# Patient Record
Sex: FEMALE | Race: WHITE | NOT HISPANIC OR LATINO | Employment: OTHER | URBAN - METROPOLITAN AREA
[De-identification: names, ages, dates, MRNs, and addresses within clinical notes are randomized per-mention and may not be internally consistent; named-entity substitution may affect disease eponyms.]

---

## 2023-02-24 LAB
ALANINE AMINOTRANSFERASE (SGPT) (U/L) IN SER/PLAS: 19 U/L (ref 7–45)
ALBUMIN (G/DL) IN SER/PLAS: 4.6 G/DL (ref 3.4–5)
ALKALINE PHOSPHATASE (U/L) IN SER/PLAS: 107 U/L (ref 33–136)
ANION GAP IN SER/PLAS: 13 MMOL/L (ref 10–20)
ASPARTATE AMINOTRANSFERASE (SGOT) (U/L) IN SER/PLAS: 26 U/L (ref 9–39)
BILIRUBIN TOTAL (MG/DL) IN SER/PLAS: 0.8 MG/DL (ref 0–1.2)
CALCIDIOL (25 OH VITAMIN D3) (NG/ML) IN SER/PLAS: 68 NG/ML
CALCIUM (MG/DL) IN SER/PLAS: 10.5 MG/DL (ref 8.6–10.6)
CARBON DIOXIDE, TOTAL (MMOL/L) IN SER/PLAS: 29 MMOL/L (ref 21–32)
CHLORIDE (MMOL/L) IN SER/PLAS: 105 MMOL/L (ref 98–107)
CHOLESTEROL (MG/DL) IN SER/PLAS: 134 MG/DL (ref 0–199)
CHOLESTEROL IN HDL (MG/DL) IN SER/PLAS: 55.4 MG/DL
CHOLESTEROL/HDL RATIO: 2.4
CREATININE (MG/DL) IN SER/PLAS: 1.05 MG/DL (ref 0.5–1.05)
GFR FEMALE: 54 ML/MIN/1.73M2
GLUCOSE (MG/DL) IN SER/PLAS: 92 MG/DL (ref 74–99)
LDL: 65 MG/DL (ref 0–99)
POC CALCIUM IONIZED (MMOL/L) IN BLOOD: 1.31 MMOL/L (ref 1.1–1.33)
POTASSIUM (MMOL/L) IN SER/PLAS: 3.9 MMOL/L (ref 3.5–5.3)
PROTEIN TOTAL: 7.2 G/DL (ref 6.4–8.2)
SODIUM (MMOL/L) IN SER/PLAS: 143 MMOL/L (ref 136–145)
TRIGLYCERIDE (MG/DL) IN SER/PLAS: 66 MG/DL (ref 0–149)
UREA NITROGEN (MG/DL) IN SER/PLAS: 26 MG/DL (ref 6–23)
VLDL: 13 MG/DL (ref 0–40)

## 2023-02-25 LAB — PARATHYRIN INTACT (PG/ML) IN SER/PLAS: 107.3 PG/ML (ref 18.5–88)

## 2023-04-11 PROBLEM — M18.12 ARTHRITIS OF CARPOMETACARPAL (CMC) JOINT OF LEFT THUMB: Status: ACTIVE | Noted: 2023-04-11

## 2023-04-11 PROBLEM — I25.10 CORONARY ARTERY DISEASE WITHOUT ANGINA PECTORIS: Status: ACTIVE | Noted: 2023-04-11

## 2023-04-11 PROBLEM — E78.5 HYPERLIPIDEMIA: Status: ACTIVE | Noted: 2023-04-11

## 2023-04-11 PROBLEM — I51.7 LEFT ATRIAL ENLARGEMENT: Status: ACTIVE | Noted: 2023-04-11

## 2023-04-11 PROBLEM — R91.8 MULTIPLE PULMONARY NODULES: Status: ACTIVE | Noted: 2023-04-11

## 2023-04-11 PROBLEM — I10 BENIGN ESSENTIAL HYPERTENSION: Status: ACTIVE | Noted: 2023-04-11

## 2023-04-11 PROBLEM — H35.30 MACULAR DEGENERATION: Status: ACTIVE | Noted: 2023-04-11

## 2023-04-11 PROBLEM — E04.2 MULTINODULAR GOITER: Status: ACTIVE | Noted: 2023-04-11

## 2023-04-11 PROBLEM — E83.52 HYPERCALCEMIA: Status: ACTIVE | Noted: 2023-04-11

## 2023-04-11 PROBLEM — G47.33 OSA (OBSTRUCTIVE SLEEP APNEA): Status: ACTIVE | Noted: 2023-04-11

## 2023-04-11 PROBLEM — G47.33 OBSTRUCTIVE SLEEP APNEA ON CPAP: Status: ACTIVE | Noted: 2023-04-11

## 2023-04-11 PROBLEM — Z96.652 HISTORY OF TOTAL LEFT KNEE REPLACEMENT: Status: ACTIVE | Noted: 2023-04-11

## 2023-04-11 PROBLEM — E04.1 THYROID NODULE: Status: ACTIVE | Noted: 2023-04-11

## 2023-04-11 PROBLEM — I10 ESSENTIAL HYPERTENSION: Status: ACTIVE | Noted: 2023-04-11

## 2023-04-11 PROBLEM — E78.2 COMBINED HYPERLIPIDEMIA: Status: ACTIVE | Noted: 2023-04-11

## 2023-04-11 PROBLEM — I48.19 PERSISTENT ATRIAL FIBRILLATION (MULTI): Status: ACTIVE | Noted: 2023-04-11

## 2023-04-11 PROBLEM — H90.2 CONDUCTIVE HEARING LOSS: Status: ACTIVE | Noted: 2023-04-11

## 2023-04-11 PROBLEM — N18.31 STAGE 3A CHRONIC KIDNEY DISEASE (MULTI): Status: ACTIVE | Noted: 2023-04-11

## 2023-04-11 RX ORDER — METOPROLOL SUCCINATE 50 MG/1
1 TABLET, EXTENDED RELEASE ORAL DAILY
COMMUNITY
Start: 2017-09-08 | End: 2023-09-08 | Stop reason: SDUPTHER

## 2023-04-11 RX ORDER — MULTIVITAMIN
TABLET ORAL
COMMUNITY

## 2023-04-11 RX ORDER — LISINOPRIL AND HYDROCHLOROTHIAZIDE 12.5; 2 MG/1; MG/1
1 TABLET ORAL DAILY
COMMUNITY
Start: 2015-09-24 | End: 2023-09-08 | Stop reason: SDUPTHER

## 2023-04-11 RX ORDER — SIMVASTATIN 40 MG/1
1 TABLET, FILM COATED ORAL DAILY
COMMUNITY
Start: 2017-09-08 | End: 2023-09-08 | Stop reason: SDUPTHER

## 2023-04-11 RX ORDER — VIT C/E/ZN/COPPR/LUTEIN/ZEAXAN 250MG-90MG
CAPSULE ORAL
COMMUNITY

## 2023-09-08 ENCOUNTER — OFFICE VISIT (OUTPATIENT)
Dept: PRIMARY CARE | Facility: CLINIC | Age: 78
End: 2023-09-08
Payer: MEDICARE

## 2023-09-08 ENCOUNTER — LAB (OUTPATIENT)
Dept: LAB | Facility: LAB | Age: 78
End: 2023-09-08
Payer: MEDICARE

## 2023-09-08 VITALS
HEIGHT: 64 IN | WEIGHT: 208 LBS | BODY MASS INDEX: 35.51 KG/M2 | RESPIRATION RATE: 12 BRPM | DIASTOLIC BLOOD PRESSURE: 76 MMHG | OXYGEN SATURATION: 96 % | SYSTOLIC BLOOD PRESSURE: 132 MMHG | HEART RATE: 72 BPM

## 2023-09-08 DIAGNOSIS — I10 BENIGN ESSENTIAL HYPERTENSION: ICD-10-CM

## 2023-09-08 DIAGNOSIS — I48.19 PERSISTENT ATRIAL FIBRILLATION (MULTI): ICD-10-CM

## 2023-09-08 DIAGNOSIS — E78.2 COMBINED HYPERLIPIDEMIA: ICD-10-CM

## 2023-09-08 DIAGNOSIS — N18.31 STAGE 3A CHRONIC KIDNEY DISEASE (MULTI): ICD-10-CM

## 2023-09-08 DIAGNOSIS — G47.33 OBSTRUCTIVE SLEEP APNEA ON CPAP: ICD-10-CM

## 2023-09-08 DIAGNOSIS — I25.10 CORONARY ARTERY DISEASE INVOLVING NATIVE CORONARY ARTERY OF NATIVE HEART WITHOUT ANGINA PECTORIS: Primary | ICD-10-CM

## 2023-09-08 PROBLEM — H90.2 CONDUCTIVE HEARING LOSS: Status: RESOLVED | Noted: 2023-04-11 | Resolved: 2023-09-08

## 2023-09-08 PROBLEM — E78.5 HYPERLIPIDEMIA: Status: RESOLVED | Noted: 2023-04-11 | Resolved: 2023-09-08

## 2023-09-08 LAB
ALANINE AMINOTRANSFERASE (SGPT) (U/L) IN SER/PLAS: 18 U/L (ref 7–45)
ALBUMIN (G/DL) IN SER/PLAS: 4.5 G/DL (ref 3.4–5)
ALKALINE PHOSPHATASE (U/L) IN SER/PLAS: 86 U/L (ref 33–136)
ANION GAP IN SER/PLAS: 15 MMOL/L (ref 10–20)
APPEARANCE, URINE: NORMAL
ASPARTATE AMINOTRANSFERASE (SGOT) (U/L) IN SER/PLAS: 24 U/L (ref 9–39)
BILIRUBIN TOTAL (MG/DL) IN SER/PLAS: 0.9 MG/DL (ref 0–1.2)
BILIRUBIN, URINE: NEGATIVE
BLOOD, URINE: NEGATIVE
CALCIUM (MG/DL) IN SER/PLAS: 10.7 MG/DL (ref 8.6–10.6)
CARBON DIOXIDE, TOTAL (MMOL/L) IN SER/PLAS: 26 MMOL/L (ref 21–32)
CHLORIDE (MMOL/L) IN SER/PLAS: 106 MMOL/L (ref 98–107)
CHOLESTEROL (MG/DL) IN SER/PLAS: 122 MG/DL (ref 0–199)
CHOLESTEROL IN HDL (MG/DL) IN SER/PLAS: 51.9 MG/DL
CHOLESTEROL/HDL RATIO: 2.4
COLOR, URINE: YELLOW
CREATININE (MG/DL) IN SER/PLAS: 1.02 MG/DL (ref 0.5–1.05)
ERYTHROCYTE DISTRIBUTION WIDTH (RATIO) BY AUTOMATED COUNT: 12.2 % (ref 11.5–14.5)
ERYTHROCYTE MEAN CORPUSCULAR HEMOGLOBIN CONCENTRATION (G/DL) BY AUTOMATED: 33.6 G/DL (ref 32–36)
ERYTHROCYTE MEAN CORPUSCULAR VOLUME (FL) BY AUTOMATED COUNT: 99 FL (ref 80–100)
ERYTHROCYTES (10*6/UL) IN BLOOD BY AUTOMATED COUNT: 4.01 X10E12/L (ref 4–5.2)
GFR FEMALE: 56 ML/MIN/1.73M2
GLUCOSE (MG/DL) IN SER/PLAS: 90 MG/DL (ref 74–99)
GLUCOSE, URINE: NEGATIVE MG/DL
HEMATOCRIT (%) IN BLOOD BY AUTOMATED COUNT: 39.6 % (ref 36–46)
HEMOGLOBIN (G/DL) IN BLOOD: 13.3 G/DL (ref 12–16)
KETONES, URINE: NEGATIVE MG/DL
LDL: 54 MG/DL (ref 0–99)
LEUKOCYTE ESTERASE, URINE: NEGATIVE
LEUKOCYTES (10*3/UL) IN BLOOD BY AUTOMATED COUNT: 6.3 X10E9/L (ref 4.4–11.3)
NITRITE, URINE: NEGATIVE
NRBC (PER 100 WBCS) BY AUTOMATED COUNT: 0 /100 WBC (ref 0–0)
PH, URINE: 5 (ref 5–8)
PLATELETS (10*3/UL) IN BLOOD AUTOMATED COUNT: 209 X10E9/L (ref 150–450)
POTASSIUM (MMOL/L) IN SER/PLAS: 3.6 MMOL/L (ref 3.5–5.3)
PROTEIN TOTAL: 7.3 G/DL (ref 6.4–8.2)
PROTEIN, URINE: NEGATIVE MG/DL
SODIUM (MMOL/L) IN SER/PLAS: 143 MMOL/L (ref 136–145)
SPECIFIC GRAVITY, URINE: 1.02 (ref 1–1.03)
THYROTROPIN (MIU/L) IN SER/PLAS BY DETECTION LIMIT <= 0.05 MIU/L: 1.66 MIU/L (ref 0.44–3.98)
TRIGLYCERIDE (MG/DL) IN SER/PLAS: 79 MG/DL (ref 0–149)
UREA NITROGEN (MG/DL) IN SER/PLAS: 27 MG/DL (ref 6–23)
UROBILINOGEN, URINE: <2 MG/DL (ref 0–1.9)
VLDL: 16 MG/DL (ref 0–40)

## 2023-09-08 PROCEDURE — 1160F RVW MEDS BY RX/DR IN RCRD: CPT | Performed by: FAMILY MEDICINE

## 2023-09-08 PROCEDURE — 1159F MED LIST DOCD IN RCRD: CPT | Performed by: FAMILY MEDICINE

## 2023-09-08 PROCEDURE — 80061 LIPID PANEL: CPT

## 2023-09-08 PROCEDURE — 3075F SYST BP GE 130 - 139MM HG: CPT | Performed by: FAMILY MEDICINE

## 2023-09-08 PROCEDURE — 84443 ASSAY THYROID STIM HORMONE: CPT

## 2023-09-08 PROCEDURE — 80053 COMPREHEN METABOLIC PANEL: CPT

## 2023-09-08 PROCEDURE — 36415 COLL VENOUS BLD VENIPUNCTURE: CPT

## 2023-09-08 PROCEDURE — 99213 OFFICE O/P EST LOW 20 MIN: CPT | Performed by: FAMILY MEDICINE

## 2023-09-08 PROCEDURE — 81003 URINALYSIS AUTO W/O SCOPE: CPT

## 2023-09-08 PROCEDURE — 1036F TOBACCO NON-USER: CPT | Performed by: FAMILY MEDICINE

## 2023-09-08 PROCEDURE — 3078F DIAST BP <80 MM HG: CPT | Performed by: FAMILY MEDICINE

## 2023-09-08 PROCEDURE — 85027 COMPLETE CBC AUTOMATED: CPT

## 2023-09-08 RX ORDER — SIMVASTATIN 40 MG/1
40 TABLET, FILM COATED ORAL DAILY
Qty: 90 TABLET | Refills: 1 | Status: SHIPPED | OUTPATIENT
Start: 2023-09-08 | End: 2024-03-04 | Stop reason: SDUPTHER

## 2023-09-08 RX ORDER — LISINOPRIL AND HYDROCHLOROTHIAZIDE 12.5; 2 MG/1; MG/1
1 TABLET ORAL DAILY
Qty: 90 TABLET | Refills: 1 | Status: SHIPPED | OUTPATIENT
Start: 2023-09-08 | End: 2024-03-04 | Stop reason: SDUPTHER

## 2023-09-08 RX ORDER — METOPROLOL SUCCINATE 50 MG/1
50 TABLET, EXTENDED RELEASE ORAL DAILY
Qty: 90 TABLET | Refills: 1 | Status: SHIPPED | OUTPATIENT
Start: 2023-09-08 | End: 2023-11-08 | Stop reason: SDUPTHER

## 2023-09-08 ASSESSMENT — ENCOUNTER SYMPTOMS
DIARRHEA: 0
OCCASIONAL FEELINGS OF UNSTEADINESS: 0
NERVOUS/ANXIOUS: 0
WHEEZING: 0
ARTHRALGIAS: 0
SINUS PRESSURE: 0
LOSS OF SENSATION IN FEET: 0
FEVER: 0
FATIGUE: 0
APPETITE CHANGE: 0
DEPRESSION: 0
DIFFICULTY URINATING: 0
NAUSEA: 0
CHEST TIGHTNESS: 0
CHILLS: 0
DYSPHORIC MOOD: 0
SHORTNESS OF BREATH: 0
ABDOMINAL DISTENTION: 0
BRUISES/BLEEDS EASILY: 0
SLEEP DISTURBANCE: 0
HEADACHES: 0
ABDOMINAL PAIN: 0
LIGHT-HEADEDNESS: 0
DYSURIA: 0
MYALGIAS: 0
ADENOPATHY: 0

## 2023-09-08 NOTE — PROGRESS NOTES
"Subjective   Patient ID: Nati Lu is a 78 y.o. female who presents for Follow-up.  Pt has chronic HTN, CAD, asymptomatic Afib. Sees Dr Lopes.  Pt is taking Lisin/hydrochlorothiazide, metoprolol, Eliquis Tolerating well.  Exercising 7 days per week   Low sodium diet is usually being followed. She has cut portions and following low carb diet. Wt down about 20# intentionally from February.  Is not monitoring home blood pressures.   Denies HA, vision changes or CP.    Pt has Dyslipidemia.   Lipid panel showed LDL in good range in February.  Currently taking Simvastatin and is tolerating well without muscle pains or weakness.     CRI is stable GFR in Feb 54.    For MIMI pt is using CPAP. Tolerating well without issues. Daytime energy is good.         Review of Systems   Constitutional:  Negative for appetite change, chills, fatigue and fever.   HENT:  Negative for congestion, ear pain and sinus pressure.    Eyes:  Negative for visual disturbance.   Respiratory:  Negative for chest tightness, shortness of breath and wheezing.    Cardiovascular:  Negative for chest pain.   Gastrointestinal:  Negative for abdominal distention, abdominal pain, diarrhea and nausea.   Genitourinary:  Negative for difficulty urinating, dysuria and pelvic pain.   Musculoskeletal:  Negative for arthralgias and myalgias.   Skin:  Negative for rash.   Allergic/Immunologic: Negative for immunocompromised state.   Neurological:  Negative for light-headedness and headaches.   Hematological:  Negative for adenopathy. Does not bruise/bleed easily.   Psychiatric/Behavioral:  Negative for dysphoric mood and sleep disturbance. The patient is not nervous/anxious.        Objective   /76   Pulse 72   Resp 12   Ht 1.613 m (5' 3.5\")   Wt 94.3 kg (208 lb)   SpO2 96%   BMI 36.27 kg/m²    Physical Exam  Constitutional:       General: She is not in acute distress.     Appearance: Normal appearance.   Cardiovascular:      Rate and Rhythm: Normal " rate and regular rhythm.      Heart sounds: Normal heart sounds. No murmur heard.  Pulmonary:      Effort: Pulmonary effort is normal.      Breath sounds: Normal breath sounds.   Abdominal:      Palpations: Abdomen is soft.      Tenderness: There is no abdominal tenderness.   Neurological:      Mental Status: She is alert.   Psychiatric:         Mood and Affect: Mood normal.         Judgment: Judgment normal.           Assessment/Plan   Diagnoses and all orders for this visit:  Coronary artery disease /Benign essential hypertension/Combined hyperlipidemia/Persistent atrial fibrillation - stable, continue current meds, keep plan for yearly follow up with Dr Lopes  Stage 3a chronic kidney disease - stable, will monitor with labs  Obstructive sleep apnea on CPAP - doing well, continue  Weight loss- recommend low carb diet, increasing water intake to at least 64oz/day, healthy snacking between meals, and regular cardiovascular exercise 150mins/week. Goal for weight loss is 1-2# per week.   Follow up in January, 30min for physical

## 2023-09-08 NOTE — PROGRESS NOTES
"Subjective   Patient ID: Nati Lu is a 78 y.o. female who presents for Follow-up.    HPI     Review of Systems    Objective   /76   Pulse 72   Resp 12   Ht 1.613 m (5' 3.5\")   Wt 94.3 kg (208 lb)   SpO2 96%   BMI 36.27 kg/m²     Physical Exam    Assessment/Plan          "

## 2023-09-11 ENCOUNTER — TELEPHONE (OUTPATIENT)
Dept: PRIMARY CARE | Facility: CLINIC | Age: 78
End: 2023-09-11
Payer: MEDICARE

## 2023-11-08 ENCOUNTER — OFFICE VISIT (OUTPATIENT)
Dept: CARDIOLOGY | Facility: CLINIC | Age: 78
End: 2023-11-08
Payer: MEDICARE

## 2023-11-08 VITALS
TEMPERATURE: 97.7 F | DIASTOLIC BLOOD PRESSURE: 80 MMHG | BODY MASS INDEX: 36.33 KG/M2 | HEIGHT: 64 IN | WEIGHT: 212.8 LBS | HEART RATE: 81 BPM | SYSTOLIC BLOOD PRESSURE: 161 MMHG

## 2023-11-08 DIAGNOSIS — E78.2 COMBINED HYPERLIPIDEMIA: ICD-10-CM

## 2023-11-08 DIAGNOSIS — I10 BENIGN ESSENTIAL HYPERTENSION: ICD-10-CM

## 2023-11-08 DIAGNOSIS — I48.19 PERSISTENT ATRIAL FIBRILLATION (MULTI): Primary | ICD-10-CM

## 2023-11-08 DIAGNOSIS — I25.10 CORONARY ARTERY DISEASE INVOLVING NATIVE CORONARY ARTERY OF NATIVE HEART WITHOUT ANGINA PECTORIS: ICD-10-CM

## 2023-11-08 PROCEDURE — 1159F MED LIST DOCD IN RCRD: CPT | Performed by: NURSE PRACTITIONER

## 2023-11-08 PROCEDURE — 99214 OFFICE O/P EST MOD 30 MIN: CPT | Performed by: NURSE PRACTITIONER

## 2023-11-08 PROCEDURE — 1160F RVW MEDS BY RX/DR IN RCRD: CPT | Performed by: NURSE PRACTITIONER

## 2023-11-08 PROCEDURE — 3079F DIAST BP 80-89 MM HG: CPT | Performed by: NURSE PRACTITIONER

## 2023-11-08 PROCEDURE — 3077F SYST BP >= 140 MM HG: CPT | Performed by: NURSE PRACTITIONER

## 2023-11-08 PROCEDURE — 1036F TOBACCO NON-USER: CPT | Performed by: NURSE PRACTITIONER

## 2023-11-08 RX ORDER — METOPROLOL SUCCINATE 50 MG/1
50 TABLET, EXTENDED RELEASE ORAL DAILY
Qty: 90 TABLET | Refills: 3 | Status: SHIPPED | OUTPATIENT
Start: 2023-11-08

## 2023-11-08 NOTE — PROGRESS NOTES
Chief Complaint:   No chief complaint on file.     History Of Present Illness:    Nati Lu is a 78 y.o. female here with for routine follow-up of permanent atrial fibrillation.  The patient has been asymptomatic.  The patient has been rate-controlled in atrial fibrillation on medical treatment which they are tolerating well and are compliant.  The current treatment strategy is rate control.  The CHADS2-VASC2 score is 4 and the ACC/AHA guidelines recommend anticoagulation for stroke prophylaxis.  The patient is being treated with and has tolerated treatment with no bleeding and is compliant.      Cardiac Calcium Score (Total 426:) - 4/25/2017    Denies dizziness, lightheadedness, SOB, CP.     LE edema that resolves in am.     Allergies:  Patient has no known allergies.    Review of Systems  All pertinent systems have been reviewed and are negative except for what is stated in the history of present illness.    All other systems have been reviewed and are negative and noncontributory to this patient's current ailments.     Last Labs:  CBC -  Lab Results   Component Value Date    WBC 6.3 09/08/2023    HGB 13.3 09/08/2023    HCT 39.6 09/08/2023    MCV 99 09/08/2023     09/08/2023       CMP -  Lab Results   Component Value Date    CALCIUM 10.7 (H) 09/08/2023    PHOS 3.7 08/11/2020    PROT 7.3 09/08/2023    ALBUMIN 4.5 09/08/2023    AST 24 09/08/2023    ALT 18 09/08/2023    ALKPHOS 86 09/08/2023    BILITOT 0.9 09/08/2023       LIPID PANEL -   Lab Results   Component Value Date    CHOL 122 09/08/2023    TRIG 79 09/08/2023    HDL 51.9 09/08/2023    CHHDL 2.4 09/08/2023    LDLF 54 09/08/2023    VLDL 16 09/08/2023       RENAL FUNCTION PANEL -   Lab Results   Component Value Date    GLUCOSE 90 09/08/2023     09/08/2023    K 3.6 09/08/2023     09/08/2023    CO2 26 09/08/2023    ANIONGAP 15 09/08/2023    BUN 27 (H) 09/08/2023    CREATININE 1.02 09/08/2023    CALCIUM 10.7 (H) 09/08/2023    PHOS 3.7  "08/11/2020    ALBUMIN 4.5 09/08/2023        No results found for: \"BNP\", \"HGBA1C\"    Objective   Vitals reviewed.   Constitutional:       Appearance: Healthy appearance. Not in distress.   Neck:      Vascular: No JVR. JVD normal.   Pulmonary:      Effort: Pulmonary effort is normal.      Breath sounds: Normal breath sounds. No wheezing. No rhonchi. No rales.   Chest:      Chest wall: Not tender to palpatation.   Cardiovascular:      PMI at left midclavicular line. Normal rate. Irregular rhythm. Normal S1. Normal S2.       Murmurs: There is no murmur.      No gallop.  No click. No rub.   Edema:     Peripheral edema absent.   Abdominal:      General: Bowel sounds are normal.      Palpations: Abdomen is soft.      Tenderness: There is no abdominal tenderness.   Musculoskeletal: Normal range of motion.         General: No tenderness.      Comments: Walk with cane Skin:     General: Skin is warm and dry.   Neurological:      General: No focal deficit present.      Mental Status: Alert and oriented to person, place and time.     Last Labs:  CBC -  Lab Results   Component Value Date    WBC 6.3 09/08/2023    HGB 13.3 09/08/2023    HCT 39.6 09/08/2023    MCV 99 09/08/2023     09/08/2023       CMP -  Lab Results   Component Value Date    CALCIUM 10.7 (H) 09/08/2023    PHOS 3.7 08/11/2020    PROT 7.3 09/08/2023    ALBUMIN 4.5 09/08/2023    AST 24 09/08/2023    ALT 18 09/08/2023    ALKPHOS 86 09/08/2023    BILITOT 0.9 09/08/2023       LIPID PANEL -   Lab Results   Component Value Date    CHOL 122 09/08/2023    TRIG 79 09/08/2023    HDL 51.9 09/08/2023    CHHDL 2.4 09/08/2023    LDLF 54 09/08/2023    VLDL 16 09/08/2023       RENAL FUNCTION PANEL -   Lab Results   Component Value Date    GLUCOSE 90 09/08/2023     09/08/2023    K 3.6 09/08/2023     09/08/2023    CO2 26 09/08/2023    ANIONGAP 15 09/08/2023    BUN 27 (H) 09/08/2023    CREATININE 1.02 09/08/2023    CALCIUM 10.7 (H) 09/08/2023    PHOS 3.7 08/11/2020 " "   ALBUMIN 4.5 09/08/2023        No results found for: \"BNP\", \"HGBA1C\"  Assessment/Plan   Diagnoses and all orders for this visit:  Persistent atrial fibrillation (CMS/HCC)  - Rate controlled  - continue Eliquis and metoprolol  Coronary artery disease involving native coronary artery of native heart without angina pectoris  - stable, no complaints  - continue statin  Combined hyperlipidemia  - well controlled, statin  Benign essential hypertension  - manual bp recheck in office: 122/64  - continue current meds      Current Outpatient Medications:     cholecalciferol (Vitamin D-3) 25 MCG (1000 UT) capsule, Take by mouth., Disp: , Rfl:     fish oil concentrate (Omega-3) 120-180 mg capsule, Take by mouth., Disp: , Rfl:     lisinopriL-hydrochlorothiazide 20-12.5 mg tablet, Take 1 tablet by mouth once daily., Disp: 90 tablet, Rfl: 1    multivitamin (Daily Multi-Vitamin) tablet, Take by mouth., Disp: , Rfl:     NON FORMULARY, Take 1 each by mouth once daily. EYE SUPPORT TABS, Disp: , Rfl:     simvastatin (Zocor) 40 mg tablet, Take 1 tablet (40 mg) by mouth once daily., Disp: 90 tablet, Rfl: 1    apixaban (Eliquis) 5 mg tablet, Take 1 tablet (5 mg) by mouth 2 times a day., Disp: 180 tablet, Rfl: 3    metoprolol succinate XL (Toprol-XL) 50 mg 24 hr tablet, Take 1 tablet (50 mg) by mouth once daily., Disp: 90 tablet, Rfl: 3    "

## 2024-01-30 ENCOUNTER — APPOINTMENT (OUTPATIENT)
Dept: PRIMARY CARE | Facility: CLINIC | Age: 79
End: 2024-01-30
Payer: COMMERCIAL

## 2024-03-04 ENCOUNTER — OFFICE VISIT (OUTPATIENT)
Dept: PRIMARY CARE | Facility: CLINIC | Age: 79
End: 2024-03-04
Payer: COMMERCIAL

## 2024-03-04 VITALS
WEIGHT: 218 LBS | HEART RATE: 104 BPM | RESPIRATION RATE: 16 BRPM | DIASTOLIC BLOOD PRESSURE: 72 MMHG | SYSTOLIC BLOOD PRESSURE: 128 MMHG | OXYGEN SATURATION: 97 % | HEIGHT: 64 IN | BODY MASS INDEX: 37.22 KG/M2

## 2024-03-04 DIAGNOSIS — E04.2 MULTINODULAR GOITER: ICD-10-CM

## 2024-03-04 DIAGNOSIS — G47.33 OBSTRUCTIVE SLEEP APNEA ON CPAP: ICD-10-CM

## 2024-03-04 DIAGNOSIS — I10 BENIGN ESSENTIAL HYPERTENSION: ICD-10-CM

## 2024-03-04 DIAGNOSIS — E66.1 CLASS 2 DRUG-INDUCED OBESITY WITHOUT SERIOUS COMORBIDITY WITH BODY MASS INDEX (BMI) OF 38.0 TO 38.9 IN ADULT: ICD-10-CM

## 2024-03-04 DIAGNOSIS — E78.2 COMBINED HYPERLIPIDEMIA: ICD-10-CM

## 2024-03-04 DIAGNOSIS — N18.31 STAGE 3A CHRONIC KIDNEY DISEASE (MULTI): ICD-10-CM

## 2024-03-04 DIAGNOSIS — I25.10 CORONARY ARTERY DISEASE INVOLVING NATIVE CORONARY ARTERY OF NATIVE HEART WITHOUT ANGINA PECTORIS: ICD-10-CM

## 2024-03-04 DIAGNOSIS — I48.19 PERSISTENT ATRIAL FIBRILLATION (MULTI): ICD-10-CM

## 2024-03-04 DIAGNOSIS — Z00.00 HEALTHCARE MAINTENANCE: Primary | ICD-10-CM

## 2024-03-04 PROBLEM — E66.812 CLASS 2 DRUG-INDUCED OBESITY WITHOUT SERIOUS COMORBIDITY WITH BODY MASS INDEX (BMI) OF 38.0 TO 38.9 IN ADULT: Status: ACTIVE | Noted: 2024-03-04

## 2024-03-04 PROCEDURE — 1123F ACP DISCUSS/DSCN MKR DOCD: CPT | Performed by: FAMILY MEDICINE

## 2024-03-04 PROCEDURE — 3078F DIAST BP <80 MM HG: CPT | Performed by: FAMILY MEDICINE

## 2024-03-04 PROCEDURE — G0439 PPPS, SUBSEQ VISIT: HCPCS | Performed by: FAMILY MEDICINE

## 2024-03-04 PROCEDURE — 99214 OFFICE O/P EST MOD 30 MIN: CPT | Performed by: FAMILY MEDICINE

## 2024-03-04 PROCEDURE — 1160F RVW MEDS BY RX/DR IN RCRD: CPT | Performed by: FAMILY MEDICINE

## 2024-03-04 PROCEDURE — 1036F TOBACCO NON-USER: CPT | Performed by: FAMILY MEDICINE

## 2024-03-04 PROCEDURE — 1157F ADVNC CARE PLAN IN RCRD: CPT | Performed by: FAMILY MEDICINE

## 2024-03-04 PROCEDURE — 3074F SYST BP LT 130 MM HG: CPT | Performed by: FAMILY MEDICINE

## 2024-03-04 PROCEDURE — 1170F FXNL STATUS ASSESSED: CPT | Performed by: FAMILY MEDICINE

## 2024-03-04 PROCEDURE — 1159F MED LIST DOCD IN RCRD: CPT | Performed by: FAMILY MEDICINE

## 2024-03-04 RX ORDER — LISINOPRIL AND HYDROCHLOROTHIAZIDE 12.5; 2 MG/1; MG/1
1 TABLET ORAL DAILY
Qty: 90 TABLET | Refills: 1 | Status: SHIPPED | OUTPATIENT
Start: 2024-03-04

## 2024-03-04 RX ORDER — SIMVASTATIN 40 MG/1
40 TABLET, FILM COATED ORAL DAILY
Qty: 90 TABLET | Refills: 1 | Status: SHIPPED | OUTPATIENT
Start: 2024-03-04

## 2024-03-04 ASSESSMENT — ENCOUNTER SYMPTOMS
DYSPHORIC MOOD: 0
SHORTNESS OF BREATH: 0
DIFFICULTY URINATING: 0
WHEEZING: 0
DYSURIA: 0
SLEEP DISTURBANCE: 0
DIARRHEA: 0
HEADACHES: 0
APPETITE CHANGE: 0
CHEST TIGHTNESS: 0
BRUISES/BLEEDS EASILY: 0
LIGHT-HEADEDNESS: 0
LOSS OF SENSATION IN FEET: 0
SINUS PRESSURE: 0
FATIGUE: 0
DEPRESSION: 0
ARTHRALGIAS: 0
NAUSEA: 0
ADENOPATHY: 0
CHILLS: 0
NERVOUS/ANXIOUS: 0
OCCASIONAL FEELINGS OF UNSTEADINESS: 1
ABDOMINAL DISTENTION: 0
ABDOMINAL PAIN: 0
FEVER: 0
MYALGIAS: 0

## 2024-03-04 ASSESSMENT — PATIENT HEALTH QUESTIONNAIRE - PHQ9
2. FEELING DOWN, DEPRESSED OR HOPELESS: NOT AT ALL
SUM OF ALL RESPONSES TO PHQ9 QUESTIONS 1 AND 2: 0
1. LITTLE INTEREST OR PLEASURE IN DOING THINGS: NOT AT ALL

## 2024-03-04 ASSESSMENT — ACTIVITIES OF DAILY LIVING (ADL)
DRESSING: INDEPENDENT
DOING_HOUSEWORK: NEEDS ASSISTANCE
BATHING: INDEPENDENT
TAKING_MEDICATION: INDEPENDENT
GROCERY_SHOPPING: NEEDS ASSISTANCE
MANAGING_FINANCES: INDEPENDENT

## 2024-03-04 NOTE — PROGRESS NOTES
Subjective   Patient ID: Nati Lu is a 78 y.o. female who presents for Medicare Annual Wellness Visit Subsequent.  PMHX, PSHx, Fam hx, and Social hx reviewed.   New concerns none  Vaccines current  Dentist seen at least yearly yese  Vision concerns none  Hearing concerns none  Diet is not overall healthy.   Smoker - no  Alcohol use - 1-2 drinks per week  Exercising 7 days per week.   Colonoscopy 2019, due to recheck  Mamm - current for 2022, would like to wait until later this year to repeat, with DEXA    Pt has chronic and stable CAD, Afib and HTN. Follows with cardiology  Pt is taking Eliquis, Lisin/hydrochlorothiazide, and Metoprolol. Tolerating well.  Exercising 7 days per week   Low sodium diet is usually being followed.   Is not monitoring home blood pressures. Denies HA, vision changes or CP.     Pt has Dyslipidemia.   Lipid panel showed LDL 54 in Sept.  Currently taking Simvastatin and is tolerating well without muscle pains or weakness.     CRI stable last check with GFR 56.     For MIMI pt is using CPAP. Tolerating well without issues. Daytime energy is good.         Review of Systems   Constitutional:  Negative for appetite change, chills, fatigue and fever.   HENT:  Negative for congestion, ear pain and sinus pressure.    Eyes:  Negative for visual disturbance.   Respiratory:  Negative for chest tightness, shortness of breath and wheezing.    Cardiovascular:  Negative for chest pain.   Gastrointestinal:  Negative for abdominal distention, abdominal pain, diarrhea and nausea.   Genitourinary:  Negative for difficulty urinating, dysuria and pelvic pain.   Musculoskeletal:  Negative for arthralgias and myalgias.   Skin:  Negative for rash.   Allergic/Immunologic: Negative for immunocompromised state.   Neurological:  Negative for light-headedness and headaches.   Hematological:  Negative for adenopathy. Does not bruise/bleed easily.   Psychiatric/Behavioral:  Negative for dysphoric mood and sleep  "disturbance. The patient is not nervous/anxious.      Medicare Wellness Billing Compliance Satisfied    *This is a visual tool to show completion of required items on the day of the visit. Green checks will only appear on the date of visit.    Review all medications by prescribing practitioner or clinical pharmacist (such as prescriptions, OTCs, herbal therapies and supplements) documented in the medical record    Past Medical, Surgical, and Family History reviewed and updated in chart    Tobacco Use Reviewed    Alcohol Use Reviewed    Illicit Drug Use Reviewed    PHQ2/9    Falls in Last Year Reviewed    Home Safety Risk Factors Reviewed    Cognitive Impairment Reviewed    Patient Self Assessment and Health Status    Current Diet Reviewed    Exercise Frequency    ADL - Hearing Impairment    ADL - Bathing    ADL - Dressing    ADL - Walks in Home    IADL - Managing Finances    IADL - Grocery Shopping    IADL - Taking Medications    IADL - Doing Housework      Objective   /72   Pulse 104   Resp 16   Ht 1.613 m (5' 3.5\")   Wt 98.9 kg (218 lb)   SpO2 97%   BMI 38.01 kg/m²    Physical Exam  Constitutional:       General: She is not in acute distress.     Appearance: Normal appearance. She is not ill-appearing.   HENT:      Head: Normocephalic and atraumatic.      Right Ear: Tympanic membrane, ear canal and external ear normal.      Left Ear: Tympanic membrane, ear canal and external ear normal.      Nose: Nose normal.      Mouth/Throat:      Mouth: Mucous membranes are moist.      Pharynx: No oropharyngeal exudate or posterior oropharyngeal erythema.   Eyes:      Extraocular Movements: Extraocular movements intact.      Conjunctiva/sclera: Conjunctivae normal.      Pupils: Pupils are equal, round, and reactive to light.   Neck:      Vascular: No carotid bruit.      Comments: + thyroid prominence  Cardiovascular:      Rate and Rhythm: Normal rate and regular rhythm.      Heart sounds: Normal " heart sounds. No murmur heard.  Pulmonary:      Breath sounds: Normal breath sounds. No wheezing, rhonchi or rales.   Abdominal:      General: Bowel sounds are normal. There is no distension.      Palpations: Abdomen is soft. There is no mass.      Tenderness: There is no abdominal tenderness.   Musculoskeletal:         General: No swelling or deformity.      Cervical back: Neck supple. No tenderness.   Lymphadenopathy:      Cervical: No cervical adenopathy.   Skin:     General: Skin is warm and dry.      Findings: No lesion or rash.   Neurological:      Mental Status: She is alert and oriented to person, place, and time.      Sensory: No sensory deficit.      Motor: No weakness.      Coordination: Coordination normal.      Deep Tendon Reflexes: Reflexes normal.   Psychiatric:         Mood and Affect: Mood normal.         Behavior: Behavior normal.         Judgment: Judgment normal.           Assessment/Plan   Diagnoses and all orders for this visit:  Healthcare maintenance - RSV vaccine recommended, other vaccines current . Recheck fasting labs. Colonoscopy, DEXA and Mammogram recommended - she declines for not will consider at next appt.  Benign essential hypertension/Afib/ CAD - asymptomatic, continue current meds and monitor  Combined hyperlipidemia - doing well on statin, continue and recheck with labs.  Multinodular goiter - discussed repeat US, she prefers to complete it later this year  Stage 3a chronic kidney disease - stable, recheck with labs  Obstructive sleep apnea on CPAP - doing well continue  Weight - recommend low carb diet, increasing water intake to at least 64oz/day, healthy snacking between meals, and regular cardiovascular exercise 150mins/week. Goal for weight loss is 1-2# per week.     Follow up in 6 months, 30min

## 2024-03-04 NOTE — PROGRESS NOTES
"Subjective   Reason for Visit: Nati Lu is an 78 y.o. female here for a Medicare Wellness visit.     Past Medical, Surgical, and Family History reviewed and updated in chart.    Reviewed all medications by prescribing practitioner or clinical pharmacist (such as prescriptions, OTCs, herbal therapies and supplements) and documented in the medical record.    HPI    Patient Care Team:  Paco Ty MD as PCP - General  Paco Ty MD as PCP - [facility] ACO Attributed Provider  Paco Ty MD as PCP - [facility] PCP     Review of Systems    Objective   Vitals:  /72   Pulse 104   Resp 16   Ht 1.613 m (5' 3.5\")   Wt 98.9 kg (218 lb)   SpO2 97%   BMI 38.01 kg/m²       Physical Exam    Assessment/Plan   Problem List Items Addressed This Visit    None         "

## 2024-09-05 ENCOUNTER — APPOINTMENT (OUTPATIENT)
Dept: PRIMARY CARE | Facility: CLINIC | Age: 79
End: 2024-09-05
Payer: COMMERCIAL

## 2024-09-05 ENCOUNTER — LAB (OUTPATIENT)
Dept: LAB | Facility: LAB | Age: 79
End: 2024-09-05
Payer: COMMERCIAL

## 2024-09-05 VITALS
RESPIRATION RATE: 12 BRPM | BODY MASS INDEX: 39.27 KG/M2 | HEIGHT: 64 IN | WEIGHT: 230 LBS | OXYGEN SATURATION: 97 % | HEART RATE: 70 BPM | SYSTOLIC BLOOD PRESSURE: 132 MMHG | DIASTOLIC BLOOD PRESSURE: 84 MMHG

## 2024-09-05 DIAGNOSIS — D12.6 ADENOMATOUS POLYP OF COLON, UNSPECIFIED PART OF COLON: ICD-10-CM

## 2024-09-05 DIAGNOSIS — G47.33 OBSTRUCTIVE SLEEP APNEA ON CPAP: ICD-10-CM

## 2024-09-05 DIAGNOSIS — E78.2 COMBINED HYPERLIPIDEMIA: ICD-10-CM

## 2024-09-05 DIAGNOSIS — I48.19 PERSISTENT ATRIAL FIBRILLATION (MULTI): ICD-10-CM

## 2024-09-05 DIAGNOSIS — Z12.31 SCREENING MAMMOGRAM FOR BREAST CANCER: ICD-10-CM

## 2024-09-05 DIAGNOSIS — I25.10 CORONARY ARTERY DISEASE INVOLVING NATIVE CORONARY ARTERY OF NATIVE HEART WITHOUT ANGINA PECTORIS: Primary | ICD-10-CM

## 2024-09-05 DIAGNOSIS — Z86.711 HX PULMONARY EMBOLISM: ICD-10-CM

## 2024-09-05 DIAGNOSIS — E83.52 HYPERCALCEMIA: ICD-10-CM

## 2024-09-05 DIAGNOSIS — N18.31 STAGE 3A CHRONIC KIDNEY DISEASE (MULTI): ICD-10-CM

## 2024-09-05 DIAGNOSIS — I10 BENIGN ESSENTIAL HYPERTENSION: ICD-10-CM

## 2024-09-05 DIAGNOSIS — E66.01 CLASS 3 SEVERE OBESITY DUE TO EXCESS CALORIES WITH SERIOUS COMORBIDITY AND BODY MASS INDEX (BMI) OF 40.0 TO 44.9 IN ADULT (MULTI): ICD-10-CM

## 2024-09-05 DIAGNOSIS — E04.1 THYROID NODULE: ICD-10-CM

## 2024-09-05 DIAGNOSIS — R80.9 PROTEINURIA, UNSPECIFIED TYPE: Primary | ICD-10-CM

## 2024-09-05 LAB
ALBUMIN SERPL BCP-MCNC: 4.3 G/DL (ref 3.4–5)
ALP SERPL-CCNC: 99 U/L (ref 33–136)
ALT SERPL W P-5'-P-CCNC: 16 U/L (ref 7–45)
ANION GAP SERPL CALC-SCNC: 11 MMOL/L (ref 10–20)
APPEARANCE UR: CLEAR
AST SERPL W P-5'-P-CCNC: 23 U/L (ref 9–39)
BILIRUB SERPL-MCNC: 0.9 MG/DL (ref 0–1.2)
BILIRUB UR STRIP.AUTO-MCNC: NEGATIVE MG/DL
BUN SERPL-MCNC: 29 MG/DL (ref 6–23)
CALCIUM SERPL-MCNC: 10.7 MG/DL (ref 8.6–10.6)
CHLORIDE SERPL-SCNC: 109 MMOL/L (ref 98–107)
CHOLEST SERPL-MCNC: 113 MG/DL (ref 0–199)
CHOLESTEROL/HDL RATIO: 2
CO2 SERPL-SCNC: 29 MMOL/L (ref 21–32)
COLOR UR: YELLOW
CREAT SERPL-MCNC: 1.08 MG/DL (ref 0.5–1.05)
EGFRCR SERPLBLD CKD-EPI 2021: 52 ML/MIN/1.73M*2
ERYTHROCYTE [DISTWIDTH] IN BLOOD BY AUTOMATED COUNT: 12.9 % (ref 11.5–14.5)
GLUCOSE SERPL-MCNC: 94 MG/DL (ref 74–99)
GLUCOSE UR STRIP.AUTO-MCNC: NORMAL MG/DL
HCT VFR BLD AUTO: 40.7 % (ref 36–46)
HDLC SERPL-MCNC: 55.4 MG/DL
HGB BLD-MCNC: 13 G/DL (ref 12–16)
HOLD SPECIMEN: NORMAL
HYALINE CASTS #/AREA URNS AUTO: ABNORMAL /LPF
KETONES UR STRIP.AUTO-MCNC: NEGATIVE MG/DL
LDLC SERPL CALC-MCNC: 44 MG/DL
LEUKOCYTE ESTERASE UR QL STRIP.AUTO: ABNORMAL
MCH RBC QN AUTO: 31.6 PG (ref 26–34)
MCHC RBC AUTO-ENTMCNC: 31.9 G/DL (ref 32–36)
MCV RBC AUTO: 99 FL (ref 80–100)
MUCOUS THREADS #/AREA URNS AUTO: ABNORMAL /LPF
NITRITE UR QL STRIP.AUTO: NEGATIVE
NON HDL CHOLESTEROL: 58 MG/DL (ref 0–149)
NRBC BLD-RTO: 0 /100 WBCS (ref 0–0)
PH UR STRIP.AUTO: 5 [PH]
PLATELET # BLD AUTO: 202 X10*3/UL (ref 150–450)
POTASSIUM SERPL-SCNC: 3.9 MMOL/L (ref 3.5–5.3)
PROT SERPL-MCNC: 7.2 G/DL (ref 6.4–8.2)
PROT UR STRIP.AUTO-MCNC: ABNORMAL MG/DL
RBC # BLD AUTO: 4.11 X10*6/UL (ref 4–5.2)
RBC # UR STRIP.AUTO: NEGATIVE /UL
RBC #/AREA URNS AUTO: ABNORMAL /HPF
SODIUM SERPL-SCNC: 145 MMOL/L (ref 136–145)
SP GR UR STRIP.AUTO: 1.02
SQUAMOUS #/AREA URNS AUTO: ABNORMAL /HPF
TRIGL SERPL-MCNC: 66 MG/DL (ref 0–149)
UROBILINOGEN UR STRIP.AUTO-MCNC: NORMAL MG/DL
VLDL: 13 MG/DL (ref 0–40)
WBC # BLD AUTO: 6.5 X10*3/UL (ref 4.4–11.3)
WBC #/AREA URNS AUTO: ABNORMAL /HPF

## 2024-09-05 PROCEDURE — 1123F ACP DISCUSS/DSCN MKR DOCD: CPT | Performed by: FAMILY MEDICINE

## 2024-09-05 PROCEDURE — 1157F ADVNC CARE PLAN IN RCRD: CPT | Performed by: FAMILY MEDICINE

## 2024-09-05 PROCEDURE — 36415 COLL VENOUS BLD VENIPUNCTURE: CPT

## 2024-09-05 PROCEDURE — 3075F SYST BP GE 130 - 139MM HG: CPT | Performed by: FAMILY MEDICINE

## 2024-09-05 PROCEDURE — 99214 OFFICE O/P EST MOD 30 MIN: CPT | Performed by: FAMILY MEDICINE

## 2024-09-05 PROCEDURE — 1159F MED LIST DOCD IN RCRD: CPT | Performed by: FAMILY MEDICINE

## 2024-09-05 PROCEDURE — 1036F TOBACCO NON-USER: CPT | Performed by: FAMILY MEDICINE

## 2024-09-05 PROCEDURE — 3079F DIAST BP 80-89 MM HG: CPT | Performed by: FAMILY MEDICINE

## 2024-09-05 RX ORDER — SIMVASTATIN 40 MG/1
40 TABLET, FILM COATED ORAL DAILY
Qty: 100 TABLET | Refills: 1 | Status: SHIPPED | OUTPATIENT
Start: 2024-09-05

## 2024-09-05 RX ORDER — LISINOPRIL AND HYDROCHLOROTHIAZIDE 12.5; 2 MG/1; MG/1
1 TABLET ORAL DAILY
Qty: 100 TABLET | Refills: 1 | Status: SHIPPED | OUTPATIENT
Start: 2024-09-05

## 2024-09-05 ASSESSMENT — ENCOUNTER SYMPTOMS
CHILLS: 0
LIGHT-HEADEDNESS: 0
FATIGUE: 0
BRUISES/BLEEDS EASILY: 0
SLEEP DISTURBANCE: 0
APPETITE CHANGE: 0
CHEST TIGHTNESS: 0
SINUS PRESSURE: 0
MYALGIAS: 0
DYSPHORIC MOOD: 0
FEVER: 0
DIFFICULTY URINATING: 0
ADENOPATHY: 0
DYSURIA: 0
ABDOMINAL PAIN: 0
WHEEZING: 0
NAUSEA: 0
ARTHRALGIAS: 0
DIARRHEA: 0
SHORTNESS OF BREATH: 0
HEADACHES: 0
NERVOUS/ANXIOUS: 0
ABDOMINAL DISTENTION: 0

## 2024-09-05 NOTE — PROGRESS NOTES
"Subjective   Patient ID: Nati Lu is a 79 y.o. female who presents for Follow-up.  Pt has chronic, stable CAD, Afib, and  HTN  Pt is taking Eliquis, Metoprolol, and Lisin/HCTZ. Tolerating well.  Exercising 0 days per week.  Low sodium diet is not being followed.   Is not monitoring home blood pressures.   Denies HA, vision changes or CP.     Pt has Dyslipidemia.   Lipid panel showed LDL 54 last year.  Currently taking Simvastatin and is tolerating well without muscle pains or weakness.     Pt has stable CRI.  GFR 56. BP is controlled last year. She is  following low sodium diet.     For MIMI pt is using CPAP. Tolerating well without issues. Daytime energy is good. Equipment is >5ys old.    For thyroid nodule she is overdue to recheck US previously ordered by Dr Garcia.        Review of Systems   Constitutional:  Negative for appetite change, chills, fatigue and fever.   HENT:  Negative for congestion, ear pain and sinus pressure.    Eyes:  Negative for visual disturbance.   Respiratory:  Negative for chest tightness, shortness of breath and wheezing.    Cardiovascular:  Negative for chest pain.   Gastrointestinal:  Negative for abdominal distention, abdominal pain, diarrhea and nausea.   Genitourinary:  Negative for difficulty urinating, dysuria and pelvic pain.   Musculoskeletal:  Negative for arthralgias and myalgias.   Skin:  Negative for rash.   Allergic/Immunologic: Negative for immunocompromised state.   Neurological:  Negative for light-headedness and headaches.   Hematological:  Negative for adenopathy. Does not bruise/bleed easily.   Psychiatric/Behavioral:  Negative for dysphoric mood and sleep disturbance. The patient is not nervous/anxious.        Objective   /84   Pulse 70   Resp 12   Ht 1.613 m (5' 3.5\")   Wt 104 kg (230 lb)   SpO2 97%   BMI 40.10 kg/m²    Physical Exam  Constitutional:       General: She is not in acute distress.     Appearance: Normal appearance.   Cardiovascular: "      Rate and Rhythm: Normal rate and regular rhythm.      Heart sounds: Normal heart sounds. No murmur heard.  Pulmonary:      Effort: Pulmonary effort is normal.      Breath sounds: Normal breath sounds.   Abdominal:      Palpations: Abdomen is soft.      Tenderness: There is no abdominal tenderness.   Neurological:      Mental Status: She is alert.   Psychiatric:         Mood and Affect: Mood normal.         Judgment: Judgment normal.       Assessment/Plan   Diagnoses and all orders for this visit:  Coronary artery disease /Persistent atrial fibrillation  - asymptomatic, continue to monito with cardiology  Benign essential hypertension - well controlled with current meds, continue  Combined hyperlipidemia - well controlled on Simvastatin per prior labs, due to recheck  Stage 3a chronic kidney disease  - stable on prior labs, recheck with labs  Hx pulmonary embolism - doing well on Eliquis  Thyroid nodule - overdue for US , reordered  MIMI on CPAP - doing well, referring for follow up with sleep medicine  Weight - recommend low carb diet, increasing water intake to at least 64oz/day, healthy snacking between meals, and regular cardiovascular exercise 150mins/week. Goal for weight loss is 1-2# per week.   Adenomatous polyp of colon - discussed and she declines colonoscopy.   Mammogram ordered.    Follow up in 6months 30min for preventative

## 2024-09-05 NOTE — PROGRESS NOTES
"Subjective   Patient ID: Nati Lu is a 79 y.o. female who presents for Follow-up.    HPI     Review of Systems    Objective   /84   Pulse 70   Resp 12   Ht 1.613 m (5' 3.5\")   Wt 104 kg (230 lb)   SpO2 97%   BMI 40.10 kg/m²     Physical Exam    Assessment/Plan          "

## 2024-09-06 LAB — BACTERIA UR CULT: NORMAL

## 2024-09-10 ENCOUNTER — HOSPITAL ENCOUNTER (OUTPATIENT)
Dept: RADIOLOGY | Facility: CLINIC | Age: 79
Discharge: HOME | End: 2024-09-10
Payer: COMMERCIAL

## 2024-09-10 VITALS — HEIGHT: 64 IN | BODY MASS INDEX: 39.27 KG/M2 | WEIGHT: 230 LBS

## 2024-09-10 DIAGNOSIS — E04.1 THYROID NODULE: ICD-10-CM

## 2024-09-10 DIAGNOSIS — Z12.31 SCREENING MAMMOGRAM FOR BREAST CANCER: ICD-10-CM

## 2024-09-10 PROCEDURE — 77063 BREAST TOMOSYNTHESIS BI: CPT | Performed by: RADIOLOGY

## 2024-09-10 PROCEDURE — 77067 SCR MAMMO BI INCL CAD: CPT | Performed by: RADIOLOGY

## 2024-09-10 PROCEDURE — 76536 US EXAM OF HEAD AND NECK: CPT

## 2024-09-10 PROCEDURE — 76536 US EXAM OF HEAD AND NECK: CPT | Performed by: RADIOLOGY

## 2024-09-10 PROCEDURE — 77067 SCR MAMMO BI INCL CAD: CPT

## 2024-09-11 ENCOUNTER — TELEPHONE (OUTPATIENT)
Dept: PRIMARY CARE | Facility: CLINIC | Age: 79
End: 2024-09-11
Payer: COMMERCIAL

## 2024-09-11 DIAGNOSIS — E04.1 THYROID NODULE: Primary | ICD-10-CM

## 2024-09-11 NOTE — TELEPHONE ENCOUNTER
----- Message from Paco Ty sent at 9/5/2024  9:13 PM EDT -----  Kidney function mildly low due to under hydration. UA may indicate UTI, culture pending. Serum calcium mildly elevated, recheck labs with urinalysis in 1 month.  ----- Message -----  From: Lab, Background User  Sent: 9/5/2024   4:35 PM EDT  To: Paco Ty MD

## 2024-09-16 ENCOUNTER — TELEPHONE (OUTPATIENT)
Dept: PRIMARY CARE | Facility: CLINIC | Age: 79
End: 2024-09-16
Payer: COMMERCIAL

## 2024-09-16 NOTE — TELEPHONE ENCOUNTER
----- Message from Paco Ty sent at 9/11/2024  9:07 PM EDT -----  Multiple thyroid nodules seenon US, 1 being large and warranting biopsy. Referral done to endocrinology.  ----- Message -----  From: Interface, Radiology Results In  Sent: 9/11/2024   8:07 PM EDT  To: Paco Ty MD

## 2024-09-18 ENCOUNTER — TELEPHONE (OUTPATIENT)
Dept: PRIMARY CARE | Facility: CLINIC | Age: 79
End: 2024-09-18
Payer: COMMERCIAL

## 2024-09-27 ENCOUNTER — APPOINTMENT (OUTPATIENT)
Dept: ENDOCRINOLOGY | Facility: CLINIC | Age: 79
End: 2024-09-27
Payer: COMMERCIAL

## 2024-09-27 VITALS
RESPIRATION RATE: 16 BRPM | HEIGHT: 64 IN | DIASTOLIC BLOOD PRESSURE: 70 MMHG | SYSTOLIC BLOOD PRESSURE: 124 MMHG | HEART RATE: 67 BPM | BODY MASS INDEX: 40.63 KG/M2 | WEIGHT: 238 LBS

## 2024-09-27 DIAGNOSIS — E04.1 THYROID NODULE: ICD-10-CM

## 2024-09-27 PROCEDURE — 1123F ACP DISCUSS/DSCN MKR DOCD: CPT | Performed by: INTERNAL MEDICINE

## 2024-09-27 PROCEDURE — 1160F RVW MEDS BY RX/DR IN RCRD: CPT | Performed by: INTERNAL MEDICINE

## 2024-09-27 PROCEDURE — 1159F MED LIST DOCD IN RCRD: CPT | Performed by: INTERNAL MEDICINE

## 2024-09-27 PROCEDURE — 1157F ADVNC CARE PLAN IN RCRD: CPT | Performed by: INTERNAL MEDICINE

## 2024-09-27 PROCEDURE — 99213 OFFICE O/P EST LOW 20 MIN: CPT | Performed by: INTERNAL MEDICINE

## 2024-09-27 PROCEDURE — 3078F DIAST BP <80 MM HG: CPT | Performed by: INTERNAL MEDICINE

## 2024-09-27 PROCEDURE — 1036F TOBACCO NON-USER: CPT | Performed by: INTERNAL MEDICINE

## 2024-09-27 PROCEDURE — 3074F SYST BP LT 130 MM HG: CPT | Performed by: INTERNAL MEDICINE

## 2024-09-27 ASSESSMENT — ENCOUNTER SYMPTOMS
SHORTNESS OF BREATH: 0
FEVER: 0
VOMITING: 0
CHILLS: 0
DIARRHEA: 0
FATIGUE: 0
NAUSEA: 0
COUGH: 0
PALPITATIONS: 0
HEADACHES: 0

## 2024-09-27 NOTE — PROGRESS NOTES
Endocrinology: Follow up visit  Subjective   Patient ID: Nati Lu is a 79 y.o. female who presents for Goiter.    PCP: Paco Ty MD    HPI  Last seen in 2022 for thyroid cyst  Here for follow up as pcp ordered repeat us and left cyst is stable but new nodule seen on right  She has no complaints  Feels well  No dysphagia  No hx xrt head and neck    Review of Systems   Constitutional:  Negative for chills, fatigue and fever.   Respiratory:  Negative for cough and shortness of breath.    Cardiovascular:  Negative for chest pain and palpitations.   Gastrointestinal:  Negative for diarrhea, nausea and vomiting.   Neurological:  Negative for headaches.       Patient Active Problem List   Diagnosis    Arthritis of carpometacarpal (CMC) joint of left thumb    Benign essential hypertension    Thyroid nodule    Stage 3a chronic kidney disease (Multi)    Persistent atrial fibrillation (Multi)    Obstructive sleep apnea on CPAP    Multiple pulmonary nodules    Multinodular goiter    Macular degeneration    Left atrial enlargement    History of total left knee replacement    Combined hyperlipidemia    Coronary artery disease without angina pectoris    Hypercalcemia    Healthcare maintenance    Class 2 drug-induced obesity without serious comorbidity with body mass index (BMI) of 38.0 to 38.9 in adult    Hx pulmonary embolism        Home Meds:  Current Outpatient Medications   Medication Instructions    apixaban (ELIQUIS) 5 mg, oral, 2 times daily    cholecalciferol (Vitamin D-3) 25 MCG (1000 UT) capsule oral    fish oil concentrate (Omega-3) 120-180 mg capsule oral    lisinopriL-hydrochlorothiazide 20-12.5 mg tablet 1 tablet, oral, Daily    metoprolol succinate XL (TOPROL-XL) 50 mg, oral, Daily    multivitamin (Daily Multi-Vitamin) tablet oral    NON FORMULARY 1 tablet, oral, Daily, EYE SUPPORT TABS    simvastatin (ZOCOR) 40 mg, oral, Daily        No Known Allergies     Objective   Vitals:    09/27/24 1540  "  BP: 124/70   Pulse: 67   Resp: 16      Vitals:    09/27/24 1540   Weight: 108 kg (238 lb)      Body mass index is 41.5 kg/m².   Physical Exam  Constitutional:       Appearance: Normal appearance. She is overweight.   HENT:      Head: Normocephalic and atraumatic.   Neck:      Comments: Difficult to palpate thyroid gland low in the neck  Cardiovascular:      Rate and Rhythm: Normal rate and regular rhythm.      Heart sounds: No murmur heard.     No gallop.   Pulmonary:      Effort: Pulmonary effort is normal.      Breath sounds: Normal breath sounds.   Abdominal:      Palpations: Abdomen is soft.      Comments: benign   Neurological:      General: No focal deficit present.      Mental Status: She is alert and oriented to person, place, and time.      Deep Tendon Reflexes: Reflexes are normal and symmetric.   Psychiatric:         Behavior: Behavior is cooperative.         Labs:  Lab Results   Component Value Date    TSH 1.66 09/08/2023    FREET4 1.03 08/11/2020      No results found for: \"PR1\", \"THYROIDPAB\", \"TSI\"     Assessment/Plan   Assessment & Plan  Thyroid nodule  Discussed recent us results  Right nodule meets criteria for fna: will order  Due for tsh: ordered   Follow up pending results  Orders:    Referral to Endocrinology    TSH with reflex to Free T4 if abnormal; Future    US guided thyroid biopsy; Future        Electronically signed by:  Adriana Garcia MD 09/27/24 3:45 PM              "

## 2024-09-27 NOTE — PATIENT INSTRUCTIONS
Thyroid biopsy will be ordered for you  Thyroid lab work can be combined with upcoming blood work   Further plans based on results

## 2024-09-27 NOTE — ASSESSMENT & PLAN NOTE
Discussed recent us results  Right nodule meets criteria for fna: will order  Due for tsh: ordered   Follow up pending results  Orders:    Referral to Endocrinology    TSH with reflex to Free T4 if abnormal; Future    US guided thyroid biopsy; Future

## 2024-10-05 ENCOUNTER — OFFICE VISIT (OUTPATIENT)
Dept: URGENT CARE | Age: 79
End: 2024-10-05
Payer: COMMERCIAL

## 2024-10-05 VITALS
HEART RATE: 69 BPM | WEIGHT: 220 LBS | OXYGEN SATURATION: 96 % | DIASTOLIC BLOOD PRESSURE: 90 MMHG | SYSTOLIC BLOOD PRESSURE: 175 MMHG | HEIGHT: 64 IN | RESPIRATION RATE: 16 BRPM | BODY MASS INDEX: 37.56 KG/M2

## 2024-10-05 DIAGNOSIS — M25.511 ACUTE PAIN OF RIGHT SHOULDER: Primary | ICD-10-CM

## 2024-10-05 RX ORDER — METHYLPREDNISOLONE 4 MG/1
TABLET ORAL
Qty: 21 TABLET | Refills: 0 | Status: SHIPPED | OUTPATIENT
Start: 2024-10-05 | End: 2024-10-12

## 2024-10-05 ASSESSMENT — ENCOUNTER SYMPTOMS: ARTHRALGIAS: 1

## 2024-10-05 NOTE — PROGRESS NOTES
"Subjective   Patient ID: Nati Lu is a 79 y.o. female. They present today with a chief complaint of Shoulder Pain.    History of Present Illness  79-year-old female with history of pulmonary embolism and arthritis presents to urgent care today with a complaint of right shoulder pain.  She denies any falls, injury or trauma but states she has recently done \"a whole bunch of paper shredding\" which required repetitive movements of her right shoulder which she is not used to.  She states the pain started 8 days ago after shredding these papers.  She notes increased pain  when trying to lift her right arm.  She has been treating her symptoms with over-the-counter medication without significant relief.  She denies any numbness, tingling, weakness, or any other symptoms.  She is on blood thinners.  No other complaints or concerns mention at this time.      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    Low HDL (under 40)    Other conditions influencing health status     Arthritis    Other pulmonary embolism without acute cor pulmonale (Multi) 09/25/2020    Pulmonary embolism    Pain in unspecified hand     Pain in hand    Personal history of colonic polyps 03/29/2019    History of colonic polyps    Personal history of other diseases of the circulatory system     History of cardiac disorder    Personal history of other endocrine, nutritional and metabolic disease 04/27/2022    History of morbid obesity    Trigger finger, unspecified finger 12/18/2015    Stenosing tenosynovitis of finger    Type O blood, Rh positive 12/18/2015    Blood type O+       Past Surgical History:   Procedure Laterality Date    CATARACT EXTRACTION Bilateral 07/01/2014    " "Cataract Surgery    COLONOSCOPY  07/01/2014    Complete Colonoscopy    HERNIA REPAIR Right 07/01/2014    Hernia Repair    KNEE ARTHROPLASTY Bilateral 07/01/2014    Knee Arthroplasty    OTHER SURGICAL HISTORY Bilateral 02/13/2019    Knee replacement    OTHER SURGICAL HISTORY Left 12/18/2015    Forearm Repair Tendon Transfer    OTHER SURGICAL HISTORY Right 07/01/2014    Open Treatment Of Trimalleolar Ankle Fracture    OTHER SURGICAL HISTORY  07/01/2014    Uterine Myomectomy    OTHER SURGICAL HISTORY Right 07/01/2014    Treatment Of Wrist Fracture        reports that she quit smoking about 39 years ago. Her smoking use included cigarettes. She has never used smokeless tobacco. She reports current alcohol use of about 1.0 standard drink of alcohol per week. She reports that she does not use drugs.    Review of Systems  Review of Systems   Musculoskeletal:  Positive for arthralgias.                                  Objective    Vitals:    10/05/24 1424   BP: 175/90   Pulse: 69   Resp: 16   SpO2: 96%   Weight: 99.8 kg (220 lb)   Height: 1.626 m (5' 4\")     No LMP recorded. Patient is postmenopausal.    Physical Exam  Vitals and nursing note reviewed.   Constitutional:       General: She is not in acute distress.     Appearance: Normal appearance. She is not ill-appearing, toxic-appearing or diaphoretic.   HENT:      Head: Normocephalic and atraumatic.      Mouth/Throat:      Mouth: Mucous membranes are moist.   Eyes:      Extraocular Movements: Extraocular movements intact.      Conjunctiva/sclera: Conjunctivae normal.      Pupils: Pupils are equal, round, and reactive to light.   Cardiovascular:      Rate and Rhythm: Normal rate and regular rhythm.      Pulses: Normal pulses.      Heart sounds: Normal heart sounds.   Pulmonary:      Effort: Pulmonary effort is normal. No respiratory distress.      Breath sounds: Normal breath sounds. No stridor. No wheezing, rhonchi or rales.   Chest:      Chest wall: No tenderness. "   Musculoskeletal:         General: No swelling, deformity or signs of injury.      Right shoulder: Tenderness present. No swelling, deformity, effusion, laceration, bony tenderness or crepitus. Decreased range of motion. Normal strength. Normal pulse.      Left shoulder: Normal.      Cervical back: Normal range of motion and neck supple.      Comments: Right shoulder with generalized pain with abduction.  No obvious deformity or dislocation.  No ecchymosis or edema.  No increased warmth.  Normal elbow and lower arm exam.   Skin:     General: Skin is warm and dry.      Capillary Refill: Capillary refill takes less than 2 seconds.   Neurological:      General: No focal deficit present.      Mental Status: She is alert and oriented to person, place, and time.   Psychiatric:         Mood and Affect: Mood normal.         Behavior: Behavior normal.         Procedures      Assessment/Plan   Allergies, medications, history, and pertinent labs/EKGs/Imaging reviewed by BARBARA Grove.     Medical Decision Making  Patient is well appearing, afebrile, non toxic, not hypoxic, and appropriate for outpatient treatment and management at time of evaluation. Patient presents with right shoulder pain as described above. Differential includes but not limited to: Strain, sprain, frozen shoulder, other.  Low suspicion for fracture given no falls or injury.  Exam consistent with overuse or tendinitis.  Recommended continued use of over-the-counter pain medication as needed as well as ice and rest.  Short dose of steroids called into her pharmacy to be used as directed.  Referral to orthopedics placed on her behalf.  Patient was discharged in stable condition.  All questions and concerns addressed.      Plan: Discussed differential with the patient. Patient voices understanding and is agreeable to close follow-up with their PCP in the next 2-3 days. They understand they should go to the emergency room immediately for any new,  worsening or concerning symptoms. Patient understands return precautions and discharge instructions.      Orders and Diagnoses  Diagnoses and all orders for this visit:  Acute pain of right shoulder  -     Referral to Orthopaedic Surgery; Future  -     methylPREDNISolone (Medrol Dospak) 4 mg tablets; Take as directed on package.      Medical Admin Record      Follow Up Instructions  No follow-ups on file.    Patient disposition: Home    Electronically signed by BARBARA Grove  2:44 PM

## 2024-10-05 NOTE — PATIENT INSTRUCTIONS
You were seen at Urgent Care today for ongoing right shoulder pain. Please treat as discussed. Please take medications as prescribed. Monitor for red flags which we spoke about, If your symptoms change, worsen or become concerning in any way, please go to the emergency room immediately, otherwise you can followup with your PCP in 2-3 days as needed  
APPTS ARE READY TO BE MADE: [x] YES    Best Family or Patient Contact (if needed):    Additional Information about above appointments (if needed):    1:   2:   3:     Other comments or requests:

## 2024-10-07 ENCOUNTER — LAB (OUTPATIENT)
Dept: LAB | Facility: LAB | Age: 79
End: 2024-10-07
Payer: COMMERCIAL

## 2024-10-07 ENCOUNTER — OFFICE VISIT (OUTPATIENT)
Dept: ORTHOPEDIC SURGERY | Facility: CLINIC | Age: 79
End: 2024-10-07
Payer: COMMERCIAL

## 2024-10-07 ENCOUNTER — HOSPITAL ENCOUNTER (OUTPATIENT)
Dept: RADIOLOGY | Facility: CLINIC | Age: 79
Discharge: HOME | End: 2024-10-07
Payer: COMMERCIAL

## 2024-10-07 VITALS — HEIGHT: 64 IN | WEIGHT: 220 LBS | BODY MASS INDEX: 37.56 KG/M2

## 2024-10-07 DIAGNOSIS — R80.9 PROTEINURIA, UNSPECIFIED TYPE: ICD-10-CM

## 2024-10-07 DIAGNOSIS — E83.52 HYPERCALCEMIA: ICD-10-CM

## 2024-10-07 DIAGNOSIS — M19.011 ARTHRITIS OF RIGHT SHOULDER REGION: ICD-10-CM

## 2024-10-07 DIAGNOSIS — M25.511 RIGHT SHOULDER PAIN, UNSPECIFIED CHRONICITY: ICD-10-CM

## 2024-10-07 DIAGNOSIS — M75.21 BICEPS TENDINITIS OF RIGHT SHOULDER: ICD-10-CM

## 2024-10-07 DIAGNOSIS — M75.121 COMPLETE TEAR OF RIGHT ROTATOR CUFF, UNSPECIFIED WHETHER TRAUMATIC: Primary | ICD-10-CM

## 2024-10-07 DIAGNOSIS — E04.1 THYROID NODULE: ICD-10-CM

## 2024-10-07 LAB
25(OH)D3 SERPL-MCNC: 63 NG/ML (ref 30–100)
APPEARANCE UR: CLEAR
BILIRUB UR STRIP.AUTO-MCNC: NEGATIVE MG/DL
CA-I BLD-SCNC: 1.37 MMOL/L (ref 1.1–1.33)
COLOR UR: YELLOW
GLUCOSE UR STRIP.AUTO-MCNC: NORMAL MG/DL
HOLD SPECIMEN: NORMAL
KETONES UR STRIP.AUTO-MCNC: NEGATIVE MG/DL
LEUKOCYTE ESTERASE UR QL STRIP.AUTO: ABNORMAL
MUCOUS THREADS #/AREA URNS AUTO: NORMAL /LPF
NITRITE UR QL STRIP.AUTO: NEGATIVE
PH UR STRIP.AUTO: 5 [PH]
PROT UR STRIP.AUTO-MCNC: ABNORMAL MG/DL
PTH-INTACT SERPL-MCNC: 127.3 PG/ML (ref 18.5–88)
RBC # UR STRIP.AUTO: ABNORMAL /UL
RBC #/AREA URNS AUTO: NORMAL /HPF
SP GR UR STRIP.AUTO: 1.01
SQUAMOUS #/AREA URNS AUTO: NORMAL /HPF
TSH SERPL-ACNC: 1.12 MIU/L (ref 0.44–3.98)
UROBILINOGEN UR STRIP.AUTO-MCNC: NORMAL MG/DL
WBC #/AREA URNS AUTO: NORMAL /HPF

## 2024-10-07 PROCEDURE — 73030 X-RAY EXAM OF SHOULDER: CPT | Mod: RIGHT SIDE | Performed by: RADIOLOGY

## 2024-10-07 PROCEDURE — 1123F ACP DISCUSS/DSCN MKR DOCD: CPT | Performed by: ORTHOPAEDIC SURGERY

## 2024-10-07 PROCEDURE — 20610 DRAIN/INJ JOINT/BURSA W/O US: CPT | Performed by: ORTHOPAEDIC SURGERY

## 2024-10-07 PROCEDURE — 1036F TOBACCO NON-USER: CPT | Performed by: ORTHOPAEDIC SURGERY

## 2024-10-07 PROCEDURE — 82330 ASSAY OF CALCIUM: CPT

## 2024-10-07 PROCEDURE — 81001 URINALYSIS AUTO W/SCOPE: CPT

## 2024-10-07 PROCEDURE — 82306 VITAMIN D 25 HYDROXY: CPT

## 2024-10-07 PROCEDURE — 1157F ADVNC CARE PLAN IN RCRD: CPT | Performed by: ORTHOPAEDIC SURGERY

## 2024-10-07 PROCEDURE — 1125F AMNT PAIN NOTED PAIN PRSNT: CPT | Performed by: ORTHOPAEDIC SURGERY

## 2024-10-07 PROCEDURE — 1159F MED LIST DOCD IN RCRD: CPT | Performed by: ORTHOPAEDIC SURGERY

## 2024-10-07 PROCEDURE — 1160F RVW MEDS BY RX/DR IN RCRD: CPT | Performed by: ORTHOPAEDIC SURGERY

## 2024-10-07 PROCEDURE — 84443 ASSAY THYROID STIM HORMONE: CPT

## 2024-10-07 PROCEDURE — 87086 URINE CULTURE/COLONY COUNT: CPT

## 2024-10-07 PROCEDURE — 36415 COLL VENOUS BLD VENIPUNCTURE: CPT

## 2024-10-07 PROCEDURE — 73030 X-RAY EXAM OF SHOULDER: CPT | Mod: RT

## 2024-10-07 PROCEDURE — 83970 ASSAY OF PARATHORMONE: CPT

## 2024-10-07 PROCEDURE — 99204 OFFICE O/P NEW MOD 45 MIN: CPT | Performed by: ORTHOPAEDIC SURGERY

## 2024-10-07 RX ORDER — TRIAMCINOLONE ACETONIDE 40 MG/ML
40 INJECTION, SUSPENSION INTRA-ARTICULAR; INTRAMUSCULAR
Status: COMPLETED | OUTPATIENT
Start: 2024-10-07 | End: 2024-10-07

## 2024-10-07 RX ORDER — LIDOCAINE HYDROCHLORIDE 5 MG/ML
4 INJECTION, SOLUTION INFILTRATION; PERINEURAL
Status: COMPLETED | OUTPATIENT
Start: 2024-10-07 | End: 2024-10-07

## 2024-10-07 ASSESSMENT — PAIN SCALES - GENERAL: PAINLEVEL_OUTOF10: 8

## 2024-10-07 ASSESSMENT — PAIN - FUNCTIONAL ASSESSMENT: PAIN_FUNCTIONAL_ASSESSMENT: 0-10

## 2024-10-07 NOTE — PROGRESS NOTES
79-year-old female who complains of 2 weeks of right shoulder pain.  Patient states there is no injury but she has been having pain in the right shoulder over the lateral aspect of the shoulder for the last 2 weeks since been getting worse.  She is try treating it with ice Tylenol anti-inflammatories and Medrol Dosepak.  She states it is worse with activity better with rest    Patients' self reported past medical history, medications, allergies, surgical history, family and social history as well as a 10 point review of systems has been documented in the new patient intake form and scanned into the patient's electronic medical record.  The intake form was reviewed by Dr Nix during the office visit and signed by Dr. Nix and the patient.  Pertinent findings are documented in the HPI.    General Multi-System Physical Exam:  Constitutional  General appearance:  Alert, oriented, and in no acute distress.  Well developed, well nourished.  Head and Face  Head and face:  Normocephalic and atraumatic.  Ears, Nose, Mouth, and Throat  External inspection of ears and nose: Normal.  Eyes:  Pupils are equal and round.  Neck  Neck:  no neck mass was observed.  Pulmonary  Respiratory effort:  no respiratory distress.  Cardiovascular  Intact distal pulses.  Lymphatic  Palpation of lymph nodes in the affected extremity:  Normal.  Skin  Skin and subcutaneous tissue:  Normal skin color and pigmentation.  Normal skin turgor.  No rashes.  Neurologic  Sensation:  normal to light touch.  Psychiatric  Judgement and insight:  Intact.  Mood and affect:  Normal.  Musculoskeletal  Right shoulder has 90 degrees of active abduction forward flexion with 120 degrees of passive abduction forward flexion 30 degrees of external rotation internally rotates to greater trochanter.  She has positive biceps tenderness positive acromioclavicular joint tenderness.  Positive Neer positive Salas positive Jobes with pain and significant weakness  neurovasc intact right upper extremity    X-rays of the patient were ordered by Dr Nix and obtained today.  Dr Nix personally reviewed the results of the x-rays.    In addition, Dr Nix independently interpreted the patient's x-rays (performed by the Radiology department) by viewing the x-ray images and this is Dr. Nix's personal interpretation:     X-rays of the right shoulder consistent with rotator cuff arthropathy.  The patient has significant proximal migration of the proximal humerus with the development of superior glenohumeral joint arthritis    We had a long discussion the patient regards to her right shoulder.  Even though her right shoulder clearly shows rotator cuff arthropathy it is really only been bothering her bad for the last 2 weeks.  Will start off with conservative treatment.  Will give her a prescription for Voltaren gel and a prescription for physical therapy for range of motion and strengthening the shoulder.  We also gave her a steroid injection in the right shoulder.  Will see her back as needed.  If the patient continues having pain she will need an MRI to confirm her massive rotator cuff tear and then talk about reverse shoulder replacement        This patient has a new, acute, previously-undiagnosed problem of their affected extremity.  We will begin treatment as listed here and monitor treatment based upon their progression and response to treatment.  Due to the fact that we are just beginning treatment on this issue, this is currently considered an undiagnosed new problem with uncertain prognosis.  The exact diagnosis and their prognosis will depend upon their response to treatment and progression of their condition as time progresses.    Due to this patient's condition, they are at a moderate risk of morbidity from additional diagnostic testing / treatment.      To help them with their pain, I wrote them a prescription for prescription strength anti-inflammatories.  The  patient was informed that there are risks of using nonsteroidal antiinflammatory (NSAID) medications.    Risks of NSAIDS include, but are not limited to, upset stomach, ulcers in the stomach and other places in the gastrointestinal tract, and a mild increase in cardiovascular risk as a result of the antiinflammatory medications.  In addition, there is an increased risk in bleeding as a result of the medications.    The patient was advised to stop taking the NSAIDs if they cause them to have an upset stomach.  NSAIDs are not supposed to be taken every day for more than a few weeks.  If they have any questions or problems with the antiinflammatory medications, they should stop taking the medication immediately and call the office.      Patient ID: Nati Lu is a 79 y.o. female.    L Inj/Asp: R subacromial bursa on 10/7/2024 12:05 PM  Indications: pain  Details: 22 G needle, posterior approach  Medications: 40 mg triamcinolone acetonide 40 mg/mL; 4 mL lidocaine 5 mg/mL (0.5 %)  Outcome: tolerated well, no immediate complications  Procedure, treatment alternatives, risks and benefits explained, specific risks discussed. Consent was given by the patient. Immediately prior to procedure a time out was called to verify the correct patient, procedure, equipment, support staff and site/side marked as required. Patient was prepped and draped in the usual sterile fashion.

## 2024-10-08 LAB — BACTERIA UR CULT: NORMAL

## 2024-10-11 ENCOUNTER — TELEPHONE (OUTPATIENT)
Dept: PRIMARY CARE | Facility: CLINIC | Age: 79
End: 2024-10-11
Payer: COMMERCIAL

## 2024-10-11 NOTE — TELEPHONE ENCOUNTER
----- Message from Paco Ty sent at 10/8/2024  7:01 AM EDT -----  Labs confirm hyperparathyroidism as cause of elevated calcium. She should address this further with Dr Garcia.  ----- Message -----  From: Lab, Background User  Sent: 10/7/2024   5:25 PM EDT  To: Paco Ty MD

## 2024-10-29 ENCOUNTER — HOSPITAL ENCOUNTER (OUTPATIENT)
Dept: RADIOLOGY | Facility: HOSPITAL | Age: 79
Discharge: HOME | End: 2024-10-29
Payer: COMMERCIAL

## 2024-10-29 VITALS
OXYGEN SATURATION: 97 % | SYSTOLIC BLOOD PRESSURE: 171 MMHG | DIASTOLIC BLOOD PRESSURE: 78 MMHG | RESPIRATION RATE: 18 BRPM | HEART RATE: 62 BPM

## 2024-10-29 DIAGNOSIS — E04.1 THYROID NODULE: ICD-10-CM

## 2024-10-29 PROCEDURE — 2500000004 HC RX 250 GENERAL PHARMACY W/ HCPCS (ALT 636 FOR OP/ED): Performed by: NURSE PRACTITIONER

## 2024-10-29 PROCEDURE — 76942 ECHO GUIDE FOR BIOPSY: CPT

## 2024-10-29 RX ORDER — LIDOCAINE HYDROCHLORIDE 10 MG/ML
INJECTION, SOLUTION EPIDURAL; INFILTRATION; INTRACAUDAL; PERINEURAL
Status: COMPLETED | OUTPATIENT
Start: 2024-10-29 | End: 2024-10-29

## 2024-10-30 LAB
LABORATORY COMMENT REPORT: NORMAL
LABORATORY COMMENT REPORT: NORMAL
PATH REPORT.FINAL DX SPEC: NORMAL
PATH REPORT.GROSS SPEC: NORMAL
PATH REPORT.TOTAL CANCER: NORMAL

## 2024-11-08 DIAGNOSIS — I10 BENIGN ESSENTIAL HYPERTENSION: ICD-10-CM

## 2024-11-11 ENCOUNTER — APPOINTMENT (OUTPATIENT)
Dept: CARDIOLOGY | Facility: CLINIC | Age: 79
End: 2024-11-11
Payer: COMMERCIAL

## 2024-11-11 RX ORDER — METOPROLOL SUCCINATE 50 MG/1
50 TABLET, EXTENDED RELEASE ORAL DAILY
Qty: 90 TABLET | Refills: 0 | Status: SHIPPED | OUTPATIENT
Start: 2024-11-11 | End: 2024-11-12 | Stop reason: SDUPTHER

## 2024-11-12 ENCOUNTER — APPOINTMENT (OUTPATIENT)
Dept: CARDIOLOGY | Facility: CLINIC | Age: 79
End: 2024-11-12
Payer: COMMERCIAL

## 2024-11-12 VITALS
WEIGHT: 224 LBS | SYSTOLIC BLOOD PRESSURE: 138 MMHG | HEART RATE: 68 BPM | DIASTOLIC BLOOD PRESSURE: 78 MMHG | BODY MASS INDEX: 38.45 KG/M2 | OXYGEN SATURATION: 99 %

## 2024-11-12 DIAGNOSIS — I48.19 PERSISTENT ATRIAL FIBRILLATION (MULTI): Primary | ICD-10-CM

## 2024-11-12 DIAGNOSIS — E78.2 COMBINED HYPERLIPIDEMIA: ICD-10-CM

## 2024-11-12 DIAGNOSIS — I25.10 CORONARY ARTERY DISEASE INVOLVING NATIVE CORONARY ARTERY OF NATIVE HEART WITHOUT ANGINA PECTORIS: ICD-10-CM

## 2024-11-12 DIAGNOSIS — I10 BENIGN ESSENTIAL HYPERTENSION: ICD-10-CM

## 2024-11-12 PROCEDURE — 1159F MED LIST DOCD IN RCRD: CPT | Performed by: NURSE PRACTITIONER

## 2024-11-12 PROCEDURE — 3075F SYST BP GE 130 - 139MM HG: CPT | Performed by: NURSE PRACTITIONER

## 2024-11-12 PROCEDURE — 99214 OFFICE O/P EST MOD 30 MIN: CPT | Performed by: NURSE PRACTITIONER

## 2024-11-12 PROCEDURE — 1160F RVW MEDS BY RX/DR IN RCRD: CPT | Performed by: NURSE PRACTITIONER

## 2024-11-12 PROCEDURE — 1123F ACP DISCUSS/DSCN MKR DOCD: CPT | Performed by: NURSE PRACTITIONER

## 2024-11-12 PROCEDURE — 3078F DIAST BP <80 MM HG: CPT | Performed by: NURSE PRACTITIONER

## 2024-11-12 PROCEDURE — 1157F ADVNC CARE PLAN IN RCRD: CPT | Performed by: NURSE PRACTITIONER

## 2024-11-12 PROCEDURE — 1036F TOBACCO NON-USER: CPT | Performed by: NURSE PRACTITIONER

## 2024-11-12 RX ORDER — METOPROLOL SUCCINATE 50 MG/1
50 TABLET, EXTENDED RELEASE ORAL DAILY
Qty: 90 TABLET | Refills: 3 | Status: SHIPPED | OUTPATIENT
Start: 2024-11-12 | End: 2025-11-12

## 2024-11-12 NOTE — PROGRESS NOTES
Chief Complaint:   Atrial Fibrillation      History Of Present Illness:    Nati Lu is a 79 y.o. female here with for routine follow-up of permanent atrial fibrillation.  The patient has been asymptomatic.  The patient has been rate-controlled in atrial fibrillation on medical treatment which they are tolerating well and are compliant.  The current treatment strategy is rate control.  The CHADS2-VASC2 score is 4 and the ACC/AHA guidelines recommend anticoagulation for stroke prophylaxis.  The patient is being treated with Eliquis and has tolerated treatment with no bleeding and is compliant.      Denies dizziness, lightheadedness, SOB, CP.     LE edema that continues to improve in am.     Doing very well. No cardiac complaints.     Cardiac Calcium Score (Total 426:) - 4/25/2017    Past Medical History:  She has a past medical history of Body mass index (BMI)40.0-44.9, adult (04/27/2022), Lipoprotein deficiency (07/01/2014), Other conditions influencing health status, Other pulmonary embolism without acute cor pulmonale (09/25/2020), Pain in unspecified hand, Personal history of colonic polyps (03/29/2019), Personal history of other diseases of the circulatory system, Personal history of other endocrine, nutritional and metabolic disease (04/27/2022), Trigger finger, unspecified finger (12/18/2015), and Type O blood, Rh positive (12/18/2015).    Past Surgical History:  She has a past surgical history that includes Other surgical history (Bilateral, 02/13/2019); Other surgical history (Left, 12/18/2015); Cataract extraction (Bilateral, 07/01/2014); Other surgical history (Right, 07/01/2014); Colonoscopy (07/01/2014); Hernia repair (Right, 07/01/2014); Knee Arthroplasty (Bilateral, 07/01/2014); Other surgical history (07/01/2014); and Other surgical history (Right, 07/01/2014).      Social History:  She reports that she quit smoking about 39 years ago. Her smoking use included cigarettes. She has never used  "smokeless tobacco. She reports current alcohol use of about 1.0 standard drink of alcohol per week. She reports that she does not use drugs.    Family History:  Family History   Problem Relation Name Age of Onset    Dementia Mother      Coronary artery disease Father      Other (PERIPHERAL ARTERY DISEASE) Father      Sudden death Brother      Colon cancer Daughter          Allergies:  Patient has no known allergies.    Review of Systems  All pertinent systems have been reviewed and are negative except for what is stated in the history of present illness.    All other systems have been reviewed and are negative and noncontributory to this patient's current ailments.     Visit Vitals  /78 (BP Location: Right arm, Patient Position: Sitting, BP Cuff Size: Large adult)   Pulse 68   Wt 102 kg (224 lb)   SpO2 99%   BMI 38.45 kg/m²   OB Status Postmenopausal   Smoking Status Former   BSA 2.15 m²     Last Labs:  CBC -  Lab Results   Component Value Date    WBC 6.5 09/05/2024    HGB 13.0 09/05/2024    HCT 40.7 09/05/2024    MCV 99 09/05/2024     09/05/2024       CMP -  Lab Results   Component Value Date    CALCIUM 10.7 (H) 09/05/2024    PHOS 3.7 08/11/2020    PROT 7.2 09/05/2024    ALBUMIN 4.3 09/05/2024    AST 23 09/05/2024    ALT 16 09/05/2024    ALKPHOS 99 09/05/2024    BILITOT 0.9 09/05/2024       LIPID PANEL -   Lab Results   Component Value Date    CHOL 113 09/05/2024    TRIG 66 09/05/2024    HDL 55.4 09/05/2024    CHHDL 2.0 09/05/2024    LDLF 54 09/08/2023    VLDL 13 09/05/2024    NHDL 58 09/05/2024       RENAL FUNCTION PANEL -   Lab Results   Component Value Date    GLUCOSE 94 09/05/2024     09/05/2024    K 3.9 09/05/2024     (H) 09/05/2024    CO2 29 09/05/2024    ANIONGAP 11 09/05/2024    BUN 29 (H) 09/05/2024    CREATININE 1.08 (H) 09/05/2024    CALCIUM 10.7 (H) 09/05/2024    PHOS 3.7 08/11/2020    ALBUMIN 4.3 09/05/2024        No results found for: \"BNP\", \"HGBA1C\"    Objective   Vitals " reviewed.   Constitutional:       Appearance: Healthy appearance. Not in distress.   Neck:      Vascular: No JVR. JVD normal.   Pulmonary:      Effort: Pulmonary effort is normal.      Breath sounds: Normal breath sounds. No wheezing. No rhonchi. No rales.   Chest:      Chest wall: Not tender to palpatation.   Cardiovascular:      PMI at left midclavicular line. Normal rate. Irregular rhythm. Normal S1. Normal S2.       Murmurs: There is no murmur.      No gallop.  No click. No rub.   Edema:     Peripheral edema absent.   Abdominal:      General: Bowel sounds are normal.      Palpations: Abdomen is soft.      Tenderness: There is no abdominal tenderness.   Musculoskeletal: Normal range of motion.         General: No tenderness.      Comments: Walk with cane Skin:     General: Skin is warm and dry.   Neurological:      General: No focal deficit present.      Mental Status: Alert and oriented to person, place and time.   Psychiatric:         Attention and Perception: Attention normal.         Mood and Affect: Mood normal.     Last Labs:  CBC -  Lab Results   Component Value Date    WBC 6.5 09/05/2024    HGB 13.0 09/05/2024    HCT 40.7 09/05/2024    MCV 99 09/05/2024     09/05/2024       CMP -  Lab Results   Component Value Date    CALCIUM 10.7 (H) 09/05/2024    PHOS 3.7 08/11/2020    PROT 7.2 09/05/2024    ALBUMIN 4.3 09/05/2024    AST 23 09/05/2024    ALT 16 09/05/2024    ALKPHOS 99 09/05/2024    BILITOT 0.9 09/05/2024       LIPID PANEL -   Lab Results   Component Value Date    CHOL 113 09/05/2024    TRIG 66 09/05/2024    HDL 55.4 09/05/2024    CHHDL 2.0 09/05/2024    LDLF 54 09/08/2023    VLDL 13 09/05/2024    NHDL 58 09/05/2024       RENAL FUNCTION PANEL -   Lab Results   Component Value Date    GLUCOSE 94 09/05/2024     09/05/2024    K 3.9 09/05/2024     (H) 09/05/2024    CO2 29 09/05/2024    ANIONGAP 11 09/05/2024    BUN 29 (H) 09/05/2024    CREATININE 1.08 (H) 09/05/2024    CALCIUM 10.7 (H)  "09/05/2024    PHOS 3.7 08/11/2020    ALBUMIN 4.3 09/05/2024        No results found for: \"BNP\", \"HGBA1C\"    Assessment/Plan   Diagnoses and all orders for this visit:  Persistent atrial fibrillation (CMS/HCC)  - Rate controlled  - continue Eliquis and metoprolol  Coronary artery disease involving native coronary artery of native heart without angina pectoris  - stable, no complaints  - continue statin  Combined hyperlipidemia  - well controlled, statin  Benign essential hypertension  - well controlled   - continue current meds    Follow up 1 year     Outpatient Medications:  Current Outpatient Medications   Medication Instructions    apixaban (ELIQUIS) 5 mg, oral, 2 times daily    cholecalciferol (Vitamin D-3) 25 MCG (1000 UT) capsule Take by mouth.    fish oil concentrate (Omega-3) 120-180 mg capsule Take by mouth.    lisinopriL-hydrochlorothiazide 20-12.5 mg tablet 1 tablet, oral, Daily    metoprolol succinate XL (TOPROL-XL) 50 mg, oral, Daily    multivit-min/iron/folic/kdv709 (HAIR, SKIN AND NAILS ADVANCED ORAL) Take by mouth.    multivitamin (Daily Multi-Vitamin) tablet Take by mouth.    NON FORMULARY 1 tablet, Daily    simvastatin (ZOCOR) 40 mg, oral, Daily     Exclusive of any other services or procedures performed, I, Macey JIMENEZ, spent 30 minutes in duration for this visit today.  This time consisted of chart review, obtaining history, and/or performing the exam as documented above, as well as, documenting the clinical information for the encounter in the electronic record, discussing treatment options, plans, and/or goals with patient, family, and/or caregiver, refilling medications, updating the electronic record, ordering medicines, lab work, imaging, referrals, and/or procedures as documented above and communicating with other [facility] professionals. I have discussed the results of laboratory, radiology, and cardiology studies with the patient and their family/caregiver.             "

## 2025-01-08 ENCOUNTER — APPOINTMENT (OUTPATIENT)
Dept: SLEEP MEDICINE | Facility: CLINIC | Age: 80
End: 2025-01-08
Payer: COMMERCIAL

## 2025-01-08 VITALS
SYSTOLIC BLOOD PRESSURE: 149 MMHG | DIASTOLIC BLOOD PRESSURE: 82 MMHG | HEIGHT: 64 IN | TEMPERATURE: 98.1 F | HEART RATE: 65 BPM | WEIGHT: 253.6 LBS | BODY MASS INDEX: 43.29 KG/M2 | RESPIRATION RATE: 18 BRPM | OXYGEN SATURATION: 97 %

## 2025-01-08 DIAGNOSIS — I10 BENIGN ESSENTIAL HYPERTENSION: Primary | ICD-10-CM

## 2025-01-08 DIAGNOSIS — G47.33 OBSTRUCTIVE SLEEP APNEA ON CPAP: ICD-10-CM

## 2025-01-08 DIAGNOSIS — G47.30 SLEEP APNEA, UNSPECIFIED TYPE: ICD-10-CM

## 2025-01-08 PROCEDURE — 1160F RVW MEDS BY RX/DR IN RCRD: CPT | Performed by: STUDENT IN AN ORGANIZED HEALTH CARE EDUCATION/TRAINING PROGRAM

## 2025-01-08 PROCEDURE — 1123F ACP DISCUSS/DSCN MKR DOCD: CPT | Performed by: STUDENT IN AN ORGANIZED HEALTH CARE EDUCATION/TRAINING PROGRAM

## 2025-01-08 PROCEDURE — 1159F MED LIST DOCD IN RCRD: CPT | Performed by: STUDENT IN AN ORGANIZED HEALTH CARE EDUCATION/TRAINING PROGRAM

## 2025-01-08 PROCEDURE — 1036F TOBACCO NON-USER: CPT | Performed by: STUDENT IN AN ORGANIZED HEALTH CARE EDUCATION/TRAINING PROGRAM

## 2025-01-08 PROCEDURE — 3077F SYST BP >= 140 MM HG: CPT | Performed by: STUDENT IN AN ORGANIZED HEALTH CARE EDUCATION/TRAINING PROGRAM

## 2025-01-08 PROCEDURE — G2211 COMPLEX E/M VISIT ADD ON: HCPCS | Performed by: STUDENT IN AN ORGANIZED HEALTH CARE EDUCATION/TRAINING PROGRAM

## 2025-01-08 PROCEDURE — 99204 OFFICE O/P NEW MOD 45 MIN: CPT | Performed by: STUDENT IN AN ORGANIZED HEALTH CARE EDUCATION/TRAINING PROGRAM

## 2025-01-08 PROCEDURE — 3079F DIAST BP 80-89 MM HG: CPT | Performed by: STUDENT IN AN ORGANIZED HEALTH CARE EDUCATION/TRAINING PROGRAM

## 2025-01-08 PROCEDURE — 1157F ADVNC CARE PLAN IN RCRD: CPT | Performed by: STUDENT IN AN ORGANIZED HEALTH CARE EDUCATION/TRAINING PROGRAM

## 2025-01-08 ASSESSMENT — ENCOUNTER SYMPTOMS
CARDIOVASCULAR NEGATIVE: 1
NEUROLOGICAL NEGATIVE: 1
CONSTITUTIONAL NEGATIVE: 1
RESPIRATORY NEGATIVE: 1
PSYCHIATRIC NEGATIVE: 1

## 2025-01-08 ASSESSMENT — SLEEP AND FATIGUE QUESTIONNAIRES
ESS-CHAD TOTAL SCORE: 4
SLEEP_PROBLEM_NOTICEABLE_TO_OTHERS: NOT AT ALL NOTICEABLE
HOW LIKELY ARE YOU TO NOD OFF OR FALL ASLEEP WHILE SITTING AND READING: SLIGHT CHANCE OF DOZING
HOW LIKELY ARE YOU TO NOD OFF OR FALL ASLEEP IN A CAR, WHILE STOPPED FOR A FEW MINUTES IN TRAFFIC: WOULD NEVER DOZE
SITING INACTIVE IN A PUBLIC PLACE LIKE A CLASS ROOM OR A MOVIE THEATER: SLIGHT CHANCE OF DOZING
DIFFICULTY_FALLING_ASLEEP: MILD
HOW LIKELY ARE YOU TO NOD OFF OR FALL ASLEEP IN A CAR, WHILE STOPPED FOR A FEW MINUTES IN TRAFFIC: WOULD NEVER DOZE
SATISFACTION_WITH_CURRENT_SLEEP_PATTERN: VERY SATISFIED
HOW LIKELY ARE YOU TO NOD OFF OR FALL ASLEEP WHILE WATCHING TV: SLIGHT CHANCE OF DOZING
SLEEP_PROBLEM_INTERFERES_DAILY_ACTIVITIES: NOT AT ALL NOTICEABLE
ESS TOTAL SCORE: 3
HOW LIKELY ARE YOU TO NOD OFF OR FALL ASLEEP WHILE SITTING AND TALKING TO SOMEONE: WOULD NEVER DOZE
HOW LIKELY ARE YOU TO NOD OFF OR FALL ASLEEP WHEN YOU ARE A PASSENGER IN A CAR FOR AN HOUR WITHOUT A BREAK: WOULD NEVER DOZE
WORRIED_DISTRESSED_DUE_TO_SLEEP: NOT AT ALL NOTICEABLE
HOW LIKELY ARE YOU TO NOD OFF OR FALL ASLEEP WHILE SITTING INACTIVE IN A PUBLIC PLACE: WOULD NEVER DOZE
HOW LIKELY ARE YOU TO NOD OFF OR FALL ASLEEP WHILE SITTING QUIETLY AFTER LUNCH WITHOUT ALCOHOL: SLIGHT CHANCE OF DOZING
HOW LIKELY ARE YOU TO NOD OFF OR FALL ASLEEP WHILE LYING DOWN TO REST IN THE AFTERNOON WHEN CIRCUMSTANCES PERMIT: WOULD NEVER DOZE

## 2025-01-08 ASSESSMENT — PATIENT HEALTH QUESTIONNAIRE - PHQ9
SUM OF ALL RESPONSES TO PHQ9 QUESTIONS 1 AND 2: 0
2. FEELING DOWN, DEPRESSED OR HOPELESS: NOT AT ALL
1. LITTLE INTEREST OR PLEASURE IN DOING THINGS: NOT AT ALL

## 2025-01-08 ASSESSMENT — COLUMBIA-SUICIDE SEVERITY RATING SCALE - C-SSRS
2. HAVE YOU ACTUALLY HAD ANY THOUGHTS OF KILLING YOURSELF?: NO
1. IN THE PAST MONTH, HAVE YOU WISHED YOU WERE DEAD OR WISHED YOU COULD GO TO SLEEP AND NOT WAKE UP?: NO
6. HAVE YOU EVER DONE ANYTHING, STARTED TO DO ANYTHING, OR PREPARED TO DO ANYTHING TO END YOUR LIFE?: NO

## 2025-01-08 NOTE — PROGRESS NOTES
Patient: Nati Lu    [id number]  : 1945 -- AGE 79 y.o.    Provider: Paul Hughes MD     Location [facility]   Service Date: 2025              [facility]  New Visit Note    ASSESSMENT/PLAN     Ms. Lu is a 79 y.o. female and She was referred to the [facility] for evaluation of problems listed below on 2025      Problem List, Orders, Assessment, Recommendations:  Problem List Items Addressed This Visit             ICD-10-CM    Benign essential hypertension - Primary I10     BP Readings from Last 1 Encounters:   25 149/82     - BP mildly elevated today but asymptomatic  - discussed at length the impact of untreated MIMI and BP control  - continue current management and follow-up with PCP           Obstructive sleep apnea on CPAP G47.33     - we will start work-up with Home sleep apnea test (HSAT) to evaluate current disease burden  - lengthy discussion on MIMI and sleep study education as well as the tips to be successful with the sleep study  - patient voiced understanding  - once renewed sleep study result is available, we will REPAP with [facility]  - current setting at fixed CPAP of 10 cwp with nasal pillow mask           BMI 40.0-44.9, adult (Multi) Z68.41     BMI Readings from Last 1 Encounters:   25 44.22 kg/m²     - encouraged healthy weight loss via diet and exercise  - consider referral to the weight loss program at follow-up visit            Disposition  Will call with sleep study results and determine F/U plan       HISTORY OF PRESENT ILLNESS     The patient's referring provider is: Paco Ty MD; Paco Ty MD    HISTORY OF PRESENT ILLNESS   Nati Lu is a 79 y.o. female with h/o Hypertension, MIMI, and Obesity who presents to a [facility] for a sleep medicine evaluation with concerns of Referral, cpap (Would like a new machine), and Sleep  Apnea.     Past Sleep History  Patient has the following sleep-related diagnoses: obstructive sleep apnea. Prior sleep study results: significant for MIMI (unsure of AHI)    Current History    On today's visit, the patient reports that she has been using her CPAP machine for about 12 years now.  She currently has an S9 machine.  This is her second machine and she is worried that this machine might be too old.  This current machine is > 7 years old.  She is otherwise doing well, working on weight loss and does keep a good eye on her BP control.    Sleep schedule:      Weekdays / Work Days Weekends / Days Off   Bedtime 10 pm  same as weekdays   Sleep latency 30 min same as weekdays   Wake time 6 am  same as weekdays   Total sleep time  Average/day:  Total sleep time 7-8 hours/day Same as weekdays   Naps No   Nocturnal awakenings Yes, about 1 times a night     Preferred sleeping position: SLEEP POSITION: supine and sidelying    Sleep-related ROS:    Sleep Initiation: no problems going to sleep  Problems going to sleep associated with: No problems going to sleep    Sleep Maintenance: wakes up about 1 times per night and easily returns to sleep after awakening  Problems staying asleep associated with: Problems Staying Asleep: nocturia    Breathing during sleep: no symptoms of snoring or concerns of breathing at night while on CPAP    RLS screen:  RLSSCREEN: - Sensations: Patient does not have unusual sensations in their extremities that cause an urge to move them     Daytime Symptoms  On awakening patient reports: no morning symptoms while on CPAP    Patient reports DAYTIME SYMPTOMS: no daytime symptoms  Patient denies daytime symptoms including: Denies: excessive daytime sleepiness  Fatigue: denies feeling fatigue    ESS: 4  MARIA TERESA: 1  FOSQ: 38      REVIEW OF SYSTEMS     REVIEW OF SYSTEMS  Review of Systems   Constitutional: Negative.    HENT: Negative.     Respiratory: Negative.     Cardiovascular: Negative.     Genitourinary: Negative.    Skin: Negative.    Neurological: Negative.    Psychiatric/Behavioral: Negative.       ALLERGIES AND MEDICATIONS     ALLERGIES  No Known Allergies    MEDICATIONS  Current Outpatient Medications   Medication Sig Dispense Refill    apixaban (Eliquis) 5 mg tablet Take 1 tablet (5 mg) by mouth 2 times a day. 180 tablet 3    cholecalciferol (Vitamin D-3) 25 MCG (1000 UT) capsule Take by mouth.      fish oil concentrate (Omega-3) 120-180 mg capsule Take by mouth.      lisinopriL-hydrochlorothiazide 20-12.5 mg tablet Take 1 tablet by mouth once daily. 100 tablet 1    metoprolol succinate XL (Toprol-XL) 50 mg 24 hr tablet Take 1 tablet (50 mg) by mouth once daily. 90 tablet 3    multivit-min/iron/folic/nqf467 (HAIR, SKIN AND NAILS ADVANCED ORAL) Take by mouth.      multivitamin (Daily Multi-Vitamin) tablet Take by mouth.      NON FORMULARY Take 1 each by mouth once daily. EYE SUPPORT TABS      simvastatin (Zocor) 40 mg tablet Take 1 tablet (40 mg) by mouth once daily. 100 tablet 1     No current facility-administered medications for this visit.         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    PAST SURGICAL HISTORY:  Past Surgical History:   Procedure Laterality Date    CATARACT EXTRACTION Bilateral 07/01/2014    Cataract Surgery    COLONOSCOPY  07/01/2014    Complete Colonoscopy    HERNIA REPAIR Right 07/01/2014    Hernia Repair    KNEE ARTHROPLASTY  "Bilateral 07/01/2014    Knee Arthroplasty    OTHER SURGICAL HISTORY Bilateral 02/13/2019    Knee replacement    OTHER SURGICAL HISTORY Left 12/18/2015    Forearm Repair Tendon Transfer    OTHER SURGICAL HISTORY Right 07/01/2014    Open Treatment Of Trimalleolar Ankle Fracture    OTHER SURGICAL HISTORY  07/01/2014    Uterine Myomectomy    OTHER SURGICAL HISTORY Right 07/01/2014    Treatment Of Wrist Fracture       FAMILY HISTORY  Family History   Problem Relation Name Age of Onset    Dementia Mother      Coronary artery disease Father      Other (PERIPHERAL ARTERY DISEASE) Father      Sudden death Brother      Colon cancer Daughter         She does not have a family history of sleep disorder.    SOCIAL HISTORY  She  reports that she quit smoking about 40 years ago. Her smoking use included cigarettes. She has never used smokeless tobacco. She reports current alcohol use of about 1.0 standard drink of alcohol per week. She reports that she does not use drugs. She currently lives alone.     Caffeine consumption: Yes, 1 cups/day  Alcohol consumption: Yes, rarely (occasional)  Smoking: No  Marijuana: No      PHYSICAL EXAM     VITAL SIGNS: /82   Pulse 65   Temp 36.7 °C (98.1 °F)   Resp 18   Ht 1.613 m (5' 3.5\")   Wt 115 kg (253 lb 9.6 oz)   SpO2 97%   BMI 44.22 kg/m²      CURRENT WEIGHT:   Vitals:    01/08/25 0837   Weight: 115 kg (253 lb 9.6 oz)     Body mass index is 44.22 kg/m².  PREVIOUS WEIGHTS:  Wt Readings from Last 3 Encounters:   01/08/25 115 kg (253 lb 9.6 oz)   11/12/24 102 kg (224 lb)   10/07/24 99.8 kg (220 lb)       Physical Exam  Vitals reviewed.   Constitutional:       General: She is not in acute distress.     Appearance: Normal appearance. She is well-developed and normal weight.   HENT:      Head: Normocephalic and atraumatic.      Nose: Nose normal. No congestion or rhinorrhea.      Mouth/Throat:      Mouth: Mucous membranes are moist.      Pharynx: Oropharynx is clear. No oropharyngeal " exudate.   Eyes:      General: No scleral icterus.     Extraocular Movements: Extraocular movements intact.      Conjunctiva/sclera: Conjunctivae normal.      Pupils: Pupils are equal, round, and reactive to light.   Neck:      Thyroid: No thyroid mass or thyroid tenderness.      Vascular: No JVD.   Cardiovascular:      Rate and Rhythm: Normal rate.      Pulses: Normal pulses.   Pulmonary:      Effort: Pulmonary effort is normal. No respiratory distress.   Musculoskeletal:      Cervical back: Normal range of motion and neck supple. No rigidity or tenderness.   Lymphadenopathy:      Cervical: No cervical adenopathy.   Neurological:      Mental Status: She is alert and oriented to person, place, and time.   Psychiatric:         Mood and Affect: Mood normal.         Behavior: Behavior normal.         Thought Content: Thought content normal.       PHYSICAL EXAM: MODIFIED MALLAMPATI SCORE: III (soft and hard palate and base of uvula visible)  TONGUE SCALLOPING: with scalloping      RESULTS/DATA     Bicarbonate (mmol/L)   Date Value   09/05/2024 29   09/08/2023 26   02/24/2023 29   10/18/2022 28             PAP Adherence  A PAP adherence download was obtained and data was reviewed personally today in clinic.

## 2025-01-08 NOTE — ASSESSMENT & PLAN NOTE
BMI Readings from Last 1 Encounters:   01/08/25 44.22 kg/m²     - encouraged healthy weight loss via diet and exercise  - consider referral to the weight loss program at follow-up visit

## 2025-01-08 NOTE — ASSESSMENT & PLAN NOTE
- we will start work-up with Home sleep apnea test (HSAT) to evaluate current disease burden  - lengthy discussion on MIMI and sleep study education as well as the tips to be successful with the sleep study  - patient voiced understanding  - once renewed sleep study result is available, we will REPAP with [facility]  - current setting at fixed CPAP of 10 cwp with nasal pillow mask

## 2025-01-08 NOTE — ASSESSMENT & PLAN NOTE
BP Readings from Last 1 Encounters:   01/08/25 149/82     - BP mildly elevated today but asymptomatic  - discussed at length the impact of untreated MIMI and BP control  - continue current management and follow-up with PCP

## 2025-01-29 ENCOUNTER — TELEPHONE (OUTPATIENT)
Dept: SLEEP MEDICINE | Facility: HOSPITAL | Age: 80
End: 2025-01-29
Payer: MEDICARE

## 2025-01-29 NOTE — TELEPHONE ENCOUNTER
Dear Dr. Hughes:    Thank you for referring your patient to a  Sleep Testing center for their home sleep apnea test (HSAT).     Your patient recently had an inconclusive HSAT due to poor quality effort sensors.     American Academy of Sleep Medicine (AASM) Guidelines typically recommend an in-center, overnight sleep test after an inconclusive attempt at an HSAT. However, we can attempt an HSAT one more time if there are special circumstances.     Given that, would you like us to re-attempt the HSAT or would you like to order an in-lab sleep study?    If you would like an in-lab sleep study, please place the order.    If you would like a repeat HSAT, no new order is needed.    Please let us know how you would like us to proceed.    One of our caregivers will reach out to schedule your patient's in-lab sleep study once the order is placed.     Kind regards,  The  Sleep Medicine Team

## 2025-02-05 ENCOUNTER — DOCUMENTATION (OUTPATIENT)
Dept: PRIMARY CARE | Facility: CLINIC | Age: 80
End: 2025-02-05
Payer: MEDICARE

## 2025-02-06 ENCOUNTER — PATIENT OUTREACH (OUTPATIENT)
Dept: PRIMARY CARE | Facility: CLINIC | Age: 80
End: 2025-02-06
Payer: MEDICARE

## 2025-02-06 DIAGNOSIS — I10 BENIGN ESSENTIAL HYPERTENSION: ICD-10-CM

## 2025-02-06 DIAGNOSIS — I48.19 PERSISTENT ATRIAL FIBRILLATION (MULTI): ICD-10-CM

## 2025-02-06 ASSESSMENT — ACTIVITIES OF DAILY LIVING (ADL): BATHING: YES

## 2025-02-06 ASSESSMENT — ENCOUNTER SYMPTOMS
OCCASIONAL FEELINGS OF UNSTEADINESS: 1
LOSS OF SENSATION IN FEET: 0
DEPRESSION: 0

## 2025-02-06 NOTE — PROGRESS NOTES
"Chart Reviewed Prior to Patient Outreach   Ht: 63.5\"  Wt: 230#   BMI: 40.10     PMH: Arthritis, HTN, Thyroid Nodule, CKD Stage 3, Persistent A-fib, MIMI on CPAP, Multiple Pulmonary Nodules, Multinodular Goiter, Macular Degeneration, Left Atrial Enlargement, Total Knee Replacement (Left Knee), HLD, CAD, Hypercalcemia, Obesity, PE.     Patient introduced to Chronic Care Management Services   Patient opted into services on 2/6/2025  Services will be performed under the direction of PCP: Dr. Ty  I have discussed the nature and availability of the service with the patient, the patient's responsibility for potential cost sharing, only one practitioner furnishing services during a calendar month, and the right to stop services at any time.     General:  Pt lives alone in a one-story home. She does not drive anymore. Her adult son, Tad lives nearby and provides transportation when needed. She has laundry on her main floor and states that she does not need to go down the stairs into her basement very often. She has Macular Degeneration and describes her vision as \"very poor\", although she has not been designated as legally blind. Pt is a retired nurse.     Ambulation:  Pt uses a cane when ambulating outside her house, but not when she's at home. No reported falls. Pt states that she does not have any pain.     Cardiovascular:  Pt has A-fib, but states that it is asymptomatic. She denies any dizziness, chest pain, SOB, headaches or LE Edema. She has a Hx of HTN, but states that she does not check her BP's at home and has not been instructed to so so.     Appetite/Intake/Weight:  Pt states that she is able to cook simple meals for herself (soup, salads). Her son takes her grocery shopping once every couple of weeks. She describes her appetite as \"too good\" and admits that she struggles with portion control. She confides that she has been Obese her whole life and she would like to lose wt. We will explore this further " during subsequent calls.     Needs:  Pt states that she does not have any needs at this time.     Topics to Address at Next Visit:  24 Hour recall, portion control, appetite awareness training.     Upcoming Visits:   3/6/2025: Primary Care with Dr. Ty at 9:30 AM  3/27/25: Podiatry with Dr. Lopez at 8:00 AM  10/24/25: Endocrinology with Dr. Garcia at 8:00 AM  11/12/25: Cardiology with BARBARA Randall     Anticipated Starting Dosage (Optional): 30mg BID Detail Level: Zone Patient Reported Weight (Optional - Include Units): 200lb

## 2025-02-12 ENCOUNTER — PROCEDURE VISIT (OUTPATIENT)
Dept: SLEEP MEDICINE | Facility: HOSPITAL | Age: 80
End: 2025-02-12
Payer: MEDICARE

## 2025-02-12 DIAGNOSIS — G47.30 SLEEP APNEA, UNSPECIFIED TYPE: ICD-10-CM

## 2025-02-12 PROCEDURE — 95806 SLEEP STUDY UNATT&RESP EFFT: CPT | Performed by: STUDENT IN AN ORGANIZED HEALTH CARE EDUCATION/TRAINING PROGRAM

## 2025-03-03 ENCOUNTER — TELEPHONE (OUTPATIENT)
Dept: SLEEP MEDICINE | Facility: HOSPITAL | Age: 80
End: 2025-03-03
Payer: MEDICARE

## 2025-03-03 DIAGNOSIS — G47.33 OSA (OBSTRUCTIVE SLEEP APNEA): ICD-10-CM

## 2025-03-03 NOTE — TELEPHONE ENCOUNTER
----- Message from Paul Hughes sent at 2/26/2025  5:30 PM EST -----  Stalin Gonzales    Let's start this patient on PAP (REPAP)    Thank you      Paul

## 2025-03-03 NOTE — TELEPHONE ENCOUNTER
Called patient and left VM regarding the availability of sleep test result.  Sleep nurse phone number [phone] - given to call back for results and plan going forward.

## 2025-03-06 ENCOUNTER — APPOINTMENT (OUTPATIENT)
Dept: PRIMARY CARE | Facility: CLINIC | Age: 80
End: 2025-03-06
Payer: COMMERCIAL

## 2025-03-13 ENCOUNTER — PATIENT OUTREACH (OUTPATIENT)
Dept: PRIMARY CARE | Facility: CLINIC | Age: 80
End: 2025-03-13
Payer: MEDICARE

## 2025-03-13 DIAGNOSIS — G47.33 OBSTRUCTIVE SLEEP APNEA ON CPAP: ICD-10-CM

## 2025-03-13 DIAGNOSIS — I10 BENIGN ESSENTIAL HYPERTENSION: ICD-10-CM

## 2025-03-13 NOTE — PROGRESS NOTES
"Chart Reviewed Prior to Patient Outreach   Ht: 63.5\"  Wt: 230#   BMI: 40.10     PMH: Arthritis, HTN, Thyroid Nodule, CKD Stage 3, Persistent A-fib, MIMI on CPAP, Multiple Pulmonary Nodules, Multinodular Goiter, Macular Degeneration, Left Atrial Enlargement, Total Knee Replacement (Left Knee), HLD, CAD, Hypercalcemia, Obesity, PE.      General:  Pt has worn CPAP at night for MIMI for several years. She underwent a sleep study at home on February and is waiting to discuss the results with the physician, however she was told by the RN at the Sleep Medicine Clinic that the CPAP unit that she is using is out of date and the physician will most likely recommend a new one for her. She also reports that she missed her appointment with Dr. Ty on 3/6/2025 because her son is out of town and the transportation company that she scheduled with through [employer] did not arrive on time and so she had to reschedule for 4/8/2025. She states that she does not need refills on any of her medications at this time.     Medications:  Pt states that there have been no changes to her medication routine and she does not need any refills at this time.     Ambulation/Pain:  Pt continues to use a cane when ambulating outside her house, but not when she's at home. No reported falls. Pt states that she did have lower back pain over the last week that was relieved with 1-2 doses of Ibuprofen (she does not take this regularly) and stretching. However, she reports that the pain reached an 8/10 at its worst, but is now completely resolved.     Cardiovascular:  Pt denies any Chest Pain, SOB, dizziness, headaches or LE Edema. She does not check her BP's at home and has not been instructed to do so.     Appetite/Intake/Weight:  Pt reports that she has been limiting snack foods and desserts because she is actively attempting to lose wt. She states that she has struggled with being overweight for many years and this has affected her self-esteem. She " would also like to discuss chair exercises for physical activity and so we plan to address this on our next call.     Needs:  Pt needs help with transportation to and from doctor's appointments. She would also like guidance on safe ways to be more active such as chair exercises.     Topics to Address at Next Visit:  24 Hour recall, portion control, appetite awareness training.     Upcoming Visits:   3/27/25: Podiatry with Dr. Lopez at 8:00 AM  4/8/2025: Primary Care with Dr. Ty at 1:30 PM   10/24/25: Endocrinology with Dr. Garcia at 8:00 AM  11/12/25: Cardiology with BIRD Randall-CNP

## 2025-03-14 NOTE — TELEPHONE ENCOUNTER
Called patient regarding the sleep study result. Patient agreeable to start APAP therapy with [facility]. Patient scheduled for a follow-up appointment with DR PHILLIPS between 30-90 days after CPAP initiation on 5/2025. Patient verbalized understanding, all questions answered.

## 2025-03-19 ENCOUNTER — TELEPHONE (OUTPATIENT)
Dept: SLEEP MEDICINE | Facility: HOSPITAL | Age: 80
End: 2025-03-19
Payer: MEDICARE

## 2025-03-19 NOTE — TELEPHONE ENCOUNTER
Pt states she needs supply order sent to [facility], new insurance is [employer] gold choice    Faxed all info to [facility] Facesheet, notes, study and order

## 2025-03-28 LAB
ALBUMIN SERPL-MCNC: 4.5 G/DL (ref 3.6–5.1)
ALP SERPL-CCNC: 74 U/L (ref 37–153)
ALT SERPL-CCNC: 15 U/L (ref 6–29)
ANION GAP SERPL CALCULATED.4IONS-SCNC: 9 MMOL/L (CALC) (ref 7–17)
AST SERPL-CCNC: 25 U/L (ref 10–35)
BILIRUB SERPL-MCNC: 0.7 MG/DL (ref 0.2–1.2)
BUN SERPL-MCNC: 30 MG/DL (ref 7–25)
CALCIUM SERPL-MCNC: 10.8 MG/DL (ref 8.6–10.4)
CHLORIDE SERPL-SCNC: 106 MMOL/L (ref 98–110)
CHOLEST SERPL-MCNC: 133 MG/DL
CHOLEST/HDLC SERPL: 2.3 (CALC)
CO2 SERPL-SCNC: 26 MMOL/L (ref 20–32)
CREAT SERPL-MCNC: 1.09 MG/DL (ref 0.6–0.95)
EGFRCR SERPLBLD CKD-EPI 2021: 51 ML/MIN/1.73M2
GLUCOSE SERPL-MCNC: 93 MG/DL (ref 65–99)
HDLC SERPL-MCNC: 59 MG/DL
LDLC SERPL CALC-MCNC: 57 MG/DL (CALC)
NONHDLC SERPL-MCNC: 74 MG/DL (CALC)
POTASSIUM SERPL-SCNC: 4.2 MMOL/L (ref 3.5–5.3)
PROT SERPL-MCNC: 7 G/DL (ref 6.1–8.1)
SODIUM SERPL-SCNC: 141 MMOL/L (ref 135–146)
TRIGL SERPL-MCNC: 86 MG/DL

## 2025-04-01 ENCOUNTER — PATIENT OUTREACH (OUTPATIENT)
Dept: PRIMARY CARE | Facility: CLINIC | Age: 80
End: 2025-04-01
Payer: MEDICARE

## 2025-04-01 DIAGNOSIS — I10 BENIGN ESSENTIAL HYPERTENSION: ICD-10-CM

## 2025-04-01 DIAGNOSIS — E66.1 CLASS 2 DRUG-INDUCED OBESITY WITHOUT SERIOUS COMORBIDITY WITH BODY MASS INDEX (BMI) OF 38.0 TO 38.9 IN ADULT: ICD-10-CM

## 2025-04-01 DIAGNOSIS — G47.33 OBSTRUCTIVE SLEEP APNEA ON CPAP: ICD-10-CM

## 2025-04-01 DIAGNOSIS — E66.812 CLASS 2 DRUG-INDUCED OBESITY WITHOUT SERIOUS COMORBIDITY WITH BODY MASS INDEX (BMI) OF 38.0 TO 38.9 IN ADULT: ICD-10-CM

## 2025-04-01 NOTE — PROGRESS NOTES
"Chart Reviewed Prior to Patient Outreach  Ht: 63.5\"  Wt: 230#   BMI: 40.10    PMH: Arthritis, HTN, Thyroid Nodule, CKD Stage 3, Persistent A-fib, MIMI on CPAP, Multiple Pulmonary Nodules, Multinodular Goiter, Macular Degeneration, Left Atrial Enlargement, Total Knee Replacement (Left Knee), HLD, CAD, Hypercalcemia, Obesity, PE.     Patient Reported Concerns:  Pt is in the process of obtaining a new CPAP unit and the approval process has taken quite some time. However, her current unit is still functioning properly. She is also currently deciding whether or not to move into an Assisted Living facility and is weighing her options. She continues to limit her intake of sugar, desserts and snack foods. She reports that she has not noticed any wt loss yet, but verbalizes that she is aware that wt loss in adulthood is generally very gradual and that she will need to be consistent to see results.     Patient Reports Feeling:  Pt states that overall, she feels well physically although she admits to feeling frustrated and limited by her visual impairment.       Medication Reconciliation:   We reviewed pt's medications. She states that she has all medications in the home and takes them as prescribed without difficulty. She states that she does not need any refills at this time.     Medication Inconsistencies?   None noted.     Medication Barriers:  None identified.     Goals:  Pt wishes to lose wt and she also would like to be more active.     Potential Barriers to Meeting Goals:   Pt is limited in her ability to prepare meals and be active due to her visual impairment.     Current Work Situation:   Pt is a retired nurse.     In the past month have your or any members of your household been unable to pay for necessities (including but not limited to: food, clothing, prescriptions, utilities, medical care):   No, this is not a problem.     Has lack of transportation kept you from any of the following: Work, medical appointments, " obtaining necessities:   Yes, pt missed an appointment with her PCP in March due to lack of transportation. This is not generally a problem, however her son who is her main source of transportation was out of town and the transport company that was supposed to pick her up canceled her ride at the last minute. This is not a common occurrence for her.     How often do you see or talk to people that you care about and feel close to?   Several times per week.     Self-Management Plan:  Pt's plan is to continue to take all medications as directed and attend all healthcare provider appointments as scheduled. She plans to continue limiting her intake of dessert/snack foods as well.     Are Homecare Services Needed?   No.     Medical Equipment Needs:   None at this time.     Referrals Needed:   None.     Upcoming Appointments:    4/8/2025: Primary Care with Dr. Ty at 1:30 PM   10/24/25: Endocrinology with Dr. Garcia at 8:00 AM  11/12/25: Cardiology with BARBARA Randall

## 2025-04-08 ENCOUNTER — APPOINTMENT (OUTPATIENT)
Dept: PRIMARY CARE | Facility: CLINIC | Age: 80
End: 2025-04-08
Payer: MEDICARE

## 2025-04-08 VITALS
HEART RATE: 54 BPM | RESPIRATION RATE: 12 BRPM | SYSTOLIC BLOOD PRESSURE: 124 MMHG | HEIGHT: 64 IN | OXYGEN SATURATION: 97 % | DIASTOLIC BLOOD PRESSURE: 84 MMHG | BODY MASS INDEX: 38.24 KG/M2 | WEIGHT: 224 LBS

## 2025-04-08 DIAGNOSIS — I48.19 PERSISTENT ATRIAL FIBRILLATION (MULTI): ICD-10-CM

## 2025-04-08 DIAGNOSIS — Z13.820 ENCOUNTER FOR OSTEOPOROSIS SCREENING IN ASYMPTOMATIC POSTMENOPAUSAL PATIENT: ICD-10-CM

## 2025-04-08 DIAGNOSIS — E66.812 CLASS 2 SEVERE OBESITY DUE TO EXCESS CALORIES WITH SERIOUS COMORBIDITY AND BODY MASS INDEX (BMI) OF 39.0 TO 39.9 IN ADULT: ICD-10-CM

## 2025-04-08 DIAGNOSIS — E04.2 MULTINODULAR GOITER: ICD-10-CM

## 2025-04-08 DIAGNOSIS — Z00.00 HEALTHCARE MAINTENANCE: Primary | ICD-10-CM

## 2025-04-08 DIAGNOSIS — N18.31 STAGE 3A CHRONIC KIDNEY DISEASE (MULTI): ICD-10-CM

## 2025-04-08 DIAGNOSIS — Z86.711 HX PULMONARY EMBOLISM: ICD-10-CM

## 2025-04-08 DIAGNOSIS — G47.33 OBSTRUCTIVE SLEEP APNEA ON CPAP: ICD-10-CM

## 2025-04-08 DIAGNOSIS — I25.10 CORONARY ARTERY DISEASE INVOLVING NATIVE CORONARY ARTERY OF NATIVE HEART WITHOUT ANGINA PECTORIS: ICD-10-CM

## 2025-04-08 DIAGNOSIS — I10 BENIGN ESSENTIAL HYPERTENSION: ICD-10-CM

## 2025-04-08 DIAGNOSIS — E78.2 COMBINED HYPERLIPIDEMIA: ICD-10-CM

## 2025-04-08 DIAGNOSIS — E66.01 CLASS 2 SEVERE OBESITY DUE TO EXCESS CALORIES WITH SERIOUS COMORBIDITY AND BODY MASS INDEX (BMI) OF 39.0 TO 39.9 IN ADULT: ICD-10-CM

## 2025-04-08 DIAGNOSIS — Z78.0 ENCOUNTER FOR OSTEOPOROSIS SCREENING IN ASYMPTOMATIC POSTMENOPAUSAL PATIENT: ICD-10-CM

## 2025-04-08 PROBLEM — R91.8 MULTIPLE PULMONARY NODULES: Status: RESOLVED | Noted: 2023-04-11 | Resolved: 2025-04-08

## 2025-04-08 PROCEDURE — 1159F MED LIST DOCD IN RCRD: CPT | Performed by: FAMILY MEDICINE

## 2025-04-08 PROCEDURE — G0439 PPPS, SUBSEQ VISIT: HCPCS | Performed by: FAMILY MEDICINE

## 2025-04-08 PROCEDURE — 3074F SYST BP LT 130 MM HG: CPT | Performed by: FAMILY MEDICINE

## 2025-04-08 PROCEDURE — 1157F ADVNC CARE PLAN IN RCRD: CPT | Performed by: FAMILY MEDICINE

## 2025-04-08 PROCEDURE — 3079F DIAST BP 80-89 MM HG: CPT | Performed by: FAMILY MEDICINE

## 2025-04-08 PROCEDURE — 1158F ADVNC CARE PLAN TLK DOCD: CPT | Performed by: FAMILY MEDICINE

## 2025-04-08 PROCEDURE — 99214 OFFICE O/P EST MOD 30 MIN: CPT | Performed by: FAMILY MEDICINE

## 2025-04-08 PROCEDURE — 1036F TOBACCO NON-USER: CPT | Performed by: FAMILY MEDICINE

## 2025-04-08 PROCEDURE — 1123F ACP DISCUSS/DSCN MKR DOCD: CPT | Performed by: FAMILY MEDICINE

## 2025-04-08 PROCEDURE — 1170F FXNL STATUS ASSESSED: CPT | Performed by: FAMILY MEDICINE

## 2025-04-08 RX ORDER — LISINOPRIL AND HYDROCHLOROTHIAZIDE 12.5; 2 MG/1; MG/1
1 TABLET ORAL DAILY
Qty: 100 TABLET | Refills: 1 | Status: SHIPPED | OUTPATIENT
Start: 2025-04-08

## 2025-04-08 RX ORDER — SIMVASTATIN 40 MG/1
40 TABLET, FILM COATED ORAL DAILY
Qty: 100 TABLET | Refills: 1 | Status: SHIPPED | OUTPATIENT
Start: 2025-04-08

## 2025-04-08 ASSESSMENT — PATIENT HEALTH QUESTIONNAIRE - PHQ9
4. FEELING TIRED OR HAVING LITTLE ENERGY: NOT AT ALL
SUM OF ALL RESPONSES TO PHQ9 QUESTIONS 1 AND 2: 0
9. THOUGHTS THAT YOU WOULD BE BETTER OFF DEAD, OR OF HURTING YOURSELF: NOT AT ALL
10. IF YOU CHECKED OFF ANY PROBLEMS, HOW DIFFICULT HAVE THESE PROBLEMS MADE IT FOR YOU TO DO YOUR WORK, TAKE CARE OF THINGS AT HOME, OR GET ALONG WITH OTHER PEOPLE: NOT DIFFICULT AT ALL
1. LITTLE INTEREST OR PLEASURE IN DOING THINGS: NOT AT ALL
5. POOR APPETITE OR OVEREATING: NOT AT ALL
3. TROUBLE FALLING OR STAYING ASLEEP OR SLEEPING TOO MUCH: NOT AT ALL
SUM OF ALL RESPONSES TO PHQ QUESTIONS 1-9: 0
7. TROUBLE CONCENTRATING ON THINGS, SUCH AS READING THE NEWSPAPER OR WATCHING TELEVISION: NOT AT ALL
2. FEELING DOWN, DEPRESSED OR HOPELESS: NOT AT ALL
8. MOVING OR SPEAKING SO SLOWLY THAT OTHER PEOPLE COULD HAVE NOTICED. OR THE OPPOSITE, BEING SO FIGETY OR RESTLESS THAT YOU HAVE BEEN MOVING AROUND A LOT MORE THAN USUAL: NOT AT ALL
6. FEELING BAD ABOUT YOURSELF - OR THAT YOU ARE A FAILURE OR HAVE LET YOURSELF OR YOUR FAMILY DOWN: NOT AT ALL

## 2025-04-08 ASSESSMENT — ACTIVITIES OF DAILY LIVING (ADL)
BATHING: INDEPENDENT
MANAGING_FINANCES: INDEPENDENT
DOING_HOUSEWORK: INDEPENDENT
GROCERY_SHOPPING: NEEDS ASSISTANCE
TAKING_MEDICATION: INDEPENDENT
DRESSING: INDEPENDENT

## 2025-04-08 ASSESSMENT — ENCOUNTER SYMPTOMS
DEPRESSION: 0
DYSURIA: 0
NAUSEA: 0
ARTHRALGIAS: 0
SINUS PRESSURE: 0
ABDOMINAL PAIN: 0
FEVER: 0
CHEST TIGHTNESS: 0
LOSS OF SENSATION IN FEET: 0
LIGHT-HEADEDNESS: 0
ABDOMINAL DISTENTION: 0
DYSPHORIC MOOD: 0
SLEEP DISTURBANCE: 0
MYALGIAS: 0
WHEEZING: 0
SHORTNESS OF BREATH: 0
HEADACHES: 0
DIFFICULTY URINATING: 0
FATIGUE: 0
ADENOPATHY: 0
DIARRHEA: 0
BRUISES/BLEEDS EASILY: 0
APPETITE CHANGE: 0
CHILLS: 0
NERVOUS/ANXIOUS: 0
OCCASIONAL FEELINGS OF UNSTEADINESS: 0

## 2025-04-08 NOTE — PROGRESS NOTES
"Subjective   Reason for Visit: Nati Lu is an 79 y.o. female here for a Medicare Wellness visit.     Past Medical, Surgical, and Family History reviewed and updated in chart.    Reviewed all medications by prescribing practitioner or clinical pharmacist (such as prescriptions, OTCs, herbal therapies and supplements) and documented in the medical record.    HPI    Patient Care Team:  Paco Ty MD as PCP - General  Paco Ty MD as PCP - [employer] PCP  Fay Curiel RN as Care Manager (Case Management)     Review of Systems    Objective   Vitals:  /84   Pulse 54   Resp 12   Ht 1.613 m (5' 3.5\")   Wt 102 kg (224 lb)   SpO2 97%   BMI 39.06 kg/m²       Physical Exam    Assessment & Plan              "

## 2025-04-08 NOTE — PATIENT INSTRUCTIONS

## 2025-04-08 NOTE — PROGRESS NOTES
Subjective   Patient ID: Nati Lu is a 79 y.o. female who presents for Medicare Annual Wellness Visit Subsequent.  PMHX, PSHx, Fam hx, and Social hx reviewed.   New concerns none  Vaccines RSV and COVID vaccines current  Dentist seen at least yearly yes  Vision concerns  - worsened retinal dystrophy  Hearing concerns none  Diet is usually overall healthy.   Smoker - no  Lung CT screening - NA  Alcohol use - averages 2-3 drinks per week  Exercising 7 days per week.   Colonoscopy 2019, overdue but she declines further colonoscopies  Mammogram 2024, current from Sept  Last PAP NA  DEXA due to recheck  ACP - advance directives, code status, and DPOA documentation discussed     Pt has chronic stable CAD and HTN. Sees Dr Greer  Pt is taking Eliquis, Lisin/hydrochlorothiazide, Metoprolol. Tolerating well.  Exercising 7 days per week   Low sodium diet is being followed.   Is not monitoring home blood pressures.   Denies HA, vision changes or CP.     Pt has Dyslipidemia.   Lipid panel showed LDL 57.  Currently taking Simvastatin and is tolerating well without muscle pains or weakness.     Pt has stable CRI.  GFR 51. BP is controlled and pt is following low sodium diet.     For thyroid nodule and mild hypercalcemia she is monitoring with Dr Garcia.     For MIMI pt is using CPAP. Daytime energy is good.     For hx PE doing well on Eliquis.         Review of Systems   Constitutional:  Negative for appetite change, chills, fatigue and fever.   HENT:  Negative for congestion, ear pain and sinus pressure.    Eyes:  Positive for visual disturbance.   Respiratory:  Negative for chest tightness, shortness of breath and wheezing.    Cardiovascular:  Negative for chest pain.   Gastrointestinal:  Negative for abdominal distention, abdominal pain, diarrhea and nausea.   Genitourinary:  Negative for difficulty urinating, dysuria and pelvic pain.   Musculoskeletal:  Negative for arthralgias and myalgias.   Skin:  Negative for rash.  "  Allergic/Immunologic: Negative for immunocompromised state.   Neurological:  Negative for light-headedness and headaches.   Hematological:  Negative for adenopathy. Does not bruise/bleed easily.   Psychiatric/Behavioral:  Negative for dysphoric mood and sleep disturbance. The patient is not nervous/anxious.        Objective   /84   Pulse 54   Resp 12   Ht 1.613 m (5' 3.5\")   Wt 102 kg (224 lb)   SpO2 97%   BMI 39.06 kg/m²    Physical Exam  Constitutional:       General: She is not in acute distress.     Appearance: Normal appearance. She is not ill-appearing.   HENT:      Head: Normocephalic and atraumatic.      Right Ear: Tympanic membrane, ear canal and external ear normal. There is no impacted cerumen.      Left Ear: Tympanic membrane, ear canal and external ear normal. There is no impacted cerumen.      Nose: Nose normal. No congestion.      Mouth/Throat:      Mouth: Mucous membranes are moist.      Pharynx: No oropharyngeal exudate or posterior oropharyngeal erythema.   Eyes:      Extraocular Movements: Extraocular movements intact.      Conjunctiva/sclera: Conjunctivae normal.   Neck:      Thyroid: No thyroid mass or thyromegaly.      Vascular: No carotid bruit.   Cardiovascular:      Rate and Rhythm: Normal rate and regular rhythm.      Heart sounds: Normal heart sounds. No murmur heard.  Pulmonary:      Breath sounds: Normal breath sounds. No wheezing, rhonchi or rales.   Abdominal:      General: Bowel sounds are normal. There is no distension.      Palpations: Abdomen is soft. There is no mass.      Tenderness: There is no abdominal tenderness.   Musculoskeletal:         General: No swelling or deformity.      Cervical back: Neck supple. No tenderness.   Lymphadenopathy:      Cervical: No cervical adenopathy.   Skin:     General: Skin is warm and dry.      Findings: No lesion or rash.   Neurological:      Mental Status: She is alert and oriented to person, place, and time.      Sensory: No " sensory deficit.      Motor: No weakness.      Coordination: Coordination normal.      Deep Tendon Reflexes: Reflexes normal.   Psychiatric:         Mood and Affect: Mood normal.         Behavior: Behavior normal.         Judgment: Judgment normal.     Medicare Wellness Billing Compliance Satisfied    *This is a visual tool to show completion of required items on the day of the visit. Green checks will only appear on the date of visit.    Review all medications by prescribing practitioner or clinical pharmacist (such as prescriptions, OTCs, herbal therapies and supplements) documented in the medical record    Past Medical, Surgical, and Family History reviewed and updated in chart    Tobacco Use Reviewed    Alcohol Use Reviewed    Illicit Drug Use Reviewed    PHQ2/9    Falls in Last Year Reviewed    Home Safety Risk Factors Reviewed    Cognitive Impairment Reviewed    Patient Self Assessment and Health Status    Current Diet Reviewed    Exercise Frequency    ADL - Hearing Impairment    ADL - Bathing    ADL - Dressing    ADL - Walks in Home    IADL - Managing Finances    IADL - Grocery Shopping    IADL - Taking Medications    IADL - Doing Housework          Assessment/Plan   Diagnoses and all orders for this visit:  Healthcare maintenance - RSV and COVID vaccines recommended at pharmacy. Labs reviewed and discussed. Mammogram discussed and declines for now. DEXA ordered. Colonoscopy current, no follow up indicated.  Benign essential hypertension - stable, continue current dose on Lisin/hydrochlorothiazide and Metoprolol and monitor  Combined hyperlipidemia - well controlled on Simvastatin, continue and monitor with labs  Coronary artery disease/Persistent atrial fibrillation/Hx pulmonary embolism - asymptomatic, doing well on Eliquis. Continue to monitor with Dr Lopes  Multinodular goiter/hypercalcemia - stable, per Dr Garcia  Stage 3a chronic kidney disease  - stable on current labs, will monitor  with routine labs  Obstructive sleep apnea on CPAP - doing well, continue  Weight - recommend low carb diet, increasing water intake to at least 64oz/day, healthy snacking between meals, and regular cardiovascular exercise 150mins/week. Goal for weight loss is 1-2# per week.     Follow up in 6 months, 30mins

## 2025-04-09 ENCOUNTER — TELEPHONE (OUTPATIENT)
Dept: SLEEP MEDICINE | Facility: HOSPITAL | Age: 80
End: 2025-04-09
Payer: MEDICARE

## 2025-05-22 ENCOUNTER — PATIENT OUTREACH (OUTPATIENT)
Dept: PRIMARY CARE | Facility: CLINIC | Age: 80
End: 2025-05-22
Payer: MEDICARE

## 2025-05-22 NOTE — PROGRESS NOTES
RN Care Manager called pt for third attempt to reach her in the month of May. No response as of this time.

## 2025-05-30 ENCOUNTER — APPOINTMENT (OUTPATIENT)
Dept: SLEEP MEDICINE | Facility: CLINIC | Age: 80
End: 2025-05-30
Payer: MEDICARE

## 2025-10-14 ENCOUNTER — APPOINTMENT (OUTPATIENT)
Dept: PRIMARY CARE | Facility: CLINIC | Age: 80
End: 2025-10-14
Payer: MEDICARE

## 2025-10-24 ENCOUNTER — APPOINTMENT (OUTPATIENT)
Facility: CLINIC | Age: 80
End: 2025-10-24
Payer: COMMERCIAL

## 2025-11-12 ENCOUNTER — APPOINTMENT (OUTPATIENT)
Dept: CARDIOLOGY | Facility: CLINIC | Age: 80
End: 2025-11-12
Payer: COMMERCIAL